# Patient Record
Sex: FEMALE | Race: WHITE | Employment: FULL TIME | URBAN - METROPOLITAN AREA
[De-identification: names, ages, dates, MRNs, and addresses within clinical notes are randomized per-mention and may not be internally consistent; named-entity substitution may affect disease eponyms.]

---

## 2019-02-07 ENCOUNTER — OFFICE VISIT (OUTPATIENT)
Dept: INTERNAL MEDICINE CLINIC | Age: 44
End: 2019-02-07
Payer: COMMERCIAL

## 2019-02-07 VITALS
HEART RATE: 82 BPM | BODY MASS INDEX: 40.82 KG/M2 | RESPIRATION RATE: 12 BRPM | DIASTOLIC BLOOD PRESSURE: 78 MMHG | WEIGHT: 245 LBS | HEIGHT: 65 IN | SYSTOLIC BLOOD PRESSURE: 130 MMHG | OXYGEN SATURATION: 98 %

## 2019-02-07 DIAGNOSIS — F32.A DEPRESSION, UNSPECIFIED DEPRESSION TYPE: ICD-10-CM

## 2019-02-07 DIAGNOSIS — I10 ESSENTIAL HYPERTENSION: ICD-10-CM

## 2019-02-07 DIAGNOSIS — E11.9 TYPE 2 DIABETES MELLITUS WITHOUT COMPLICATION, WITHOUT LONG-TERM CURRENT USE OF INSULIN (HCC): Primary | ICD-10-CM

## 2019-02-07 DIAGNOSIS — E78.5 HYPERLIPIDEMIA, UNSPECIFIED HYPERLIPIDEMIA TYPE: ICD-10-CM

## 2019-02-07 PROCEDURE — 99214 OFFICE O/P EST MOD 30 MIN: CPT | Performed by: FAMILY MEDICINE

## 2019-02-07 RX ORDER — DILTIAZEM HYDROCHLORIDE 300 MG/1
1 CAPSULE, COATED, EXTENDED RELEASE ORAL DAILY
Refills: 0 | COMMUNITY
Start: 2018-11-12 | End: 2019-02-07

## 2019-02-07 RX ORDER — DILTIAZEM HYDROCHLORIDE 300 MG/1
300 CAPSULE, COATED, EXTENDED RELEASE ORAL DAILY
Qty: 90 CAPSULE | Refills: 0 | Status: SHIPPED | OUTPATIENT
Start: 2019-02-07 | End: 2019-05-15 | Stop reason: SDUPTHER

## 2019-02-07 RX ORDER — FLUOXETINE HYDROCHLORIDE 20 MG/1
1 CAPSULE ORAL DAILY
Refills: 0 | COMMUNITY
Start: 2018-11-12 | End: 2019-02-07

## 2019-02-07 RX ORDER — LOSARTAN POTASSIUM AND HYDROCHLOROTHIAZIDE 25; 100 MG/1; MG/1
1 TABLET ORAL DAILY
Refills: 0 | COMMUNITY
Start: 2018-11-26 | End: 2019-02-07

## 2019-02-07 RX ORDER — FLUOXETINE HYDROCHLORIDE 20 MG/1
20 CAPSULE ORAL DAILY
Qty: 90 CAPSULE | Refills: 0 | Status: SHIPPED | OUTPATIENT
Start: 2019-02-07 | End: 2019-07-05 | Stop reason: SDUPTHER

## 2019-02-07 RX ORDER — LOSARTAN POTASSIUM AND HYDROCHLOROTHIAZIDE 25; 100 MG/1; MG/1
1 TABLET ORAL DAILY
Qty: 90 TABLET | Refills: 0 | Status: SHIPPED | OUTPATIENT
Start: 2019-02-07 | End: 2019-07-01 | Stop reason: SDUPTHER

## 2019-02-07 RX ORDER — ATORVASTATIN CALCIUM 20 MG/1
20 TABLET, FILM COATED ORAL DAILY
Qty: 90 TABLET | Refills: 0 | Status: SHIPPED | OUTPATIENT
Start: 2019-02-07 | End: 2019-07-09 | Stop reason: SDUPTHER

## 2019-02-07 RX ORDER — METFORMIN HYDROCHLORIDE 500 MG/1
1 TABLET, EXTENDED RELEASE ORAL DAILY
Refills: 1 | COMMUNITY
Start: 2018-12-30 | End: 2019-02-07

## 2019-02-07 RX ORDER — METFORMIN HYDROCHLORIDE 500 MG/1
500 TABLET, EXTENDED RELEASE ORAL
Qty: 90 TABLET | Refills: 0 | Status: SHIPPED | OUTPATIENT
Start: 2019-02-07 | End: 2019-08-30 | Stop reason: SDUPTHER

## 2019-02-07 RX ORDER — ATORVASTATIN CALCIUM 20 MG/1
1 TABLET, FILM COATED ORAL DAILY
Refills: 0 | COMMUNITY
Start: 2018-11-12 | End: 2019-02-07

## 2019-02-10 ASSESSMENT — ENCOUNTER SYMPTOMS
CHEST TIGHTNESS: 0
NAUSEA: 0
WHEEZING: 0
SHORTNESS OF BREATH: 0
BLOOD IN STOOL: 0
EYES NEGATIVE: 1
ABDOMINAL PAIN: 0
BACK PAIN: 0

## 2019-05-07 ENCOUNTER — TELEPHONE (OUTPATIENT)
Dept: INTERNAL MEDICINE CLINIC | Age: 44
End: 2019-05-07

## 2019-05-07 ENCOUNTER — OFFICE VISIT (OUTPATIENT)
Dept: INTERNAL MEDICINE CLINIC | Age: 44
End: 2019-05-07
Payer: COMMERCIAL

## 2019-05-07 VITALS
OXYGEN SATURATION: 98 % | WEIGHT: 244 LBS | BODY MASS INDEX: 40.65 KG/M2 | SYSTOLIC BLOOD PRESSURE: 126 MMHG | HEART RATE: 77 BPM | HEIGHT: 65 IN | DIASTOLIC BLOOD PRESSURE: 78 MMHG

## 2019-05-07 DIAGNOSIS — S20.359A: Primary | ICD-10-CM

## 2019-05-07 DIAGNOSIS — D49.2 NEOPLASM OF SKIN OF EYELID: ICD-10-CM

## 2019-05-07 PROCEDURE — 99213 OFFICE O/P EST LOW 20 MIN: CPT | Performed by: FAMILY MEDICINE

## 2019-05-07 ASSESSMENT — ENCOUNTER SYMPTOMS
EYE PAIN: 0
SHORTNESS OF BREATH: 0
ABDOMINAL PAIN: 0
CHEST TIGHTNESS: 0
EYE DISCHARGE: 0
COUGH: 0
NAUSEA: 0
BACK PAIN: 0

## 2019-05-07 NOTE — TELEPHONE ENCOUNTER
Pt called to report Urgent care could not remove the foreign object from her skin, Pt is requesting the referral to Dr Ana M Navarrete be made.

## 2019-05-07 NOTE — TELEPHONE ENCOUNTER
Referral to Dr. Parry Bark ordered. Give the patient their office number to call him tomorrow for an appointment(That is how their office wants it done--she can also ask about her eye lesion.  I did include that on the referral as well)

## 2019-05-15 DIAGNOSIS — I10 ESSENTIAL HYPERTENSION: ICD-10-CM

## 2019-05-15 RX ORDER — DILTIAZEM HYDROCHLORIDE 300 MG/1
CAPSULE, COATED, EXTENDED RELEASE ORAL
Qty: 90 CAPSULE | Refills: 0 | Status: SHIPPED | OUTPATIENT
Start: 2019-05-15 | End: 2019-07-05 | Stop reason: SDUPTHER

## 2019-07-01 DIAGNOSIS — I10 ESSENTIAL HYPERTENSION: ICD-10-CM

## 2019-07-01 RX ORDER — LOSARTAN POTASSIUM AND HYDROCHLOROTHIAZIDE 25; 100 MG/1; MG/1
TABLET ORAL
Qty: 90 TABLET | Refills: 0 | Status: SHIPPED | OUTPATIENT
Start: 2019-07-01 | End: 2019-09-13 | Stop reason: SDUPTHER

## 2019-07-05 DIAGNOSIS — F32.A DEPRESSION, UNSPECIFIED DEPRESSION TYPE: ICD-10-CM

## 2019-07-05 DIAGNOSIS — I10 ESSENTIAL HYPERTENSION: ICD-10-CM

## 2019-07-08 RX ORDER — FLUOXETINE HYDROCHLORIDE 20 MG/1
CAPSULE ORAL
Qty: 90 CAPSULE | Refills: 0 | Status: SHIPPED | OUTPATIENT
Start: 2019-07-08 | End: 2019-09-13 | Stop reason: SDUPTHER

## 2019-07-08 RX ORDER — DILTIAZEM HYDROCHLORIDE 300 MG/1
CAPSULE, COATED, EXTENDED RELEASE ORAL
Qty: 90 CAPSULE | Refills: 0 | Status: SHIPPED | OUTPATIENT
Start: 2019-07-08 | End: 2019-09-13 | Stop reason: SDUPTHER

## 2019-07-09 DIAGNOSIS — E78.5 HYPERLIPIDEMIA, UNSPECIFIED HYPERLIPIDEMIA TYPE: ICD-10-CM

## 2019-07-10 RX ORDER — ATORVASTATIN CALCIUM 20 MG/1
TABLET, FILM COATED ORAL
Qty: 90 TABLET | Refills: 0 | Status: SHIPPED | OUTPATIENT
Start: 2019-07-10 | End: 2019-09-13 | Stop reason: SDUPTHER

## 2019-08-30 DIAGNOSIS — E11.9 TYPE 2 DIABETES MELLITUS WITHOUT COMPLICATION, WITHOUT LONG-TERM CURRENT USE OF INSULIN (HCC): ICD-10-CM

## 2019-08-30 RX ORDER — METFORMIN HYDROCHLORIDE 500 MG/1
TABLET, EXTENDED RELEASE ORAL
Qty: 90 TABLET | Refills: 0 | Status: SHIPPED | OUTPATIENT
Start: 2019-08-30 | End: 2019-09-13 | Stop reason: SDUPTHER

## 2019-09-13 ENCOUNTER — OFFICE VISIT (OUTPATIENT)
Dept: INTERNAL MEDICINE CLINIC | Age: 44
End: 2019-09-13
Payer: COMMERCIAL

## 2019-09-13 VITALS
HEIGHT: 65 IN | SYSTOLIC BLOOD PRESSURE: 120 MMHG | OXYGEN SATURATION: 96 % | BODY MASS INDEX: 41.79 KG/M2 | DIASTOLIC BLOOD PRESSURE: 94 MMHG | WEIGHT: 250.8 LBS | HEART RATE: 81 BPM

## 2019-09-13 DIAGNOSIS — E78.5 HYPERLIPIDEMIA, UNSPECIFIED HYPERLIPIDEMIA TYPE: ICD-10-CM

## 2019-09-13 DIAGNOSIS — E11.9 TYPE 2 DIABETES MELLITUS WITHOUT COMPLICATION, WITHOUT LONG-TERM CURRENT USE OF INSULIN (HCC): Primary | ICD-10-CM

## 2019-09-13 DIAGNOSIS — I10 ESSENTIAL HYPERTENSION: ICD-10-CM

## 2019-09-13 DIAGNOSIS — Z23 NEED FOR DIPHTHERIA-TETANUS-PERTUSSIS (TDAP) VACCINE: ICD-10-CM

## 2019-09-13 DIAGNOSIS — F32.A DEPRESSION, UNSPECIFIED DEPRESSION TYPE: ICD-10-CM

## 2019-09-13 LAB
AVERAGE GLUCOSE: NORMAL
HBA1C MFR BLD: 6.5 %

## 2019-09-13 PROCEDURE — 90471 IMMUNIZATION ADMIN: CPT | Performed by: FAMILY MEDICINE

## 2019-09-13 PROCEDURE — 99214 OFFICE O/P EST MOD 30 MIN: CPT | Performed by: FAMILY MEDICINE

## 2019-09-13 PROCEDURE — 90715 TDAP VACCINE 7 YRS/> IM: CPT | Performed by: FAMILY MEDICINE

## 2019-09-13 RX ORDER — METFORMIN HYDROCHLORIDE 500 MG/1
TABLET, EXTENDED RELEASE ORAL
Qty: 90 TABLET | Refills: 1 | Status: SHIPPED | OUTPATIENT
Start: 2019-09-13 | End: 2020-03-13 | Stop reason: SDUPTHER

## 2019-09-13 RX ORDER — ATORVASTATIN CALCIUM 20 MG/1
TABLET, FILM COATED ORAL
Qty: 90 TABLET | Refills: 1 | Status: SHIPPED | OUTPATIENT
Start: 2019-09-13 | End: 2020-03-13 | Stop reason: SDUPTHER

## 2019-09-13 RX ORDER — FLUOXETINE HYDROCHLORIDE 20 MG/1
CAPSULE ORAL
Qty: 90 CAPSULE | Refills: 1 | Status: SHIPPED | OUTPATIENT
Start: 2019-09-13 | End: 2020-03-13 | Stop reason: SDUPTHER

## 2019-09-13 RX ORDER — LOSARTAN POTASSIUM AND HYDROCHLOROTHIAZIDE 25; 100 MG/1; MG/1
TABLET ORAL
Qty: 90 TABLET | Refills: 1 | Status: SHIPPED | OUTPATIENT
Start: 2019-09-13 | End: 2019-12-16

## 2019-09-13 RX ORDER — DILTIAZEM HYDROCHLORIDE 300 MG/1
CAPSULE, COATED, EXTENDED RELEASE ORAL
Qty: 90 CAPSULE | Refills: 1 | Status: SHIPPED | OUTPATIENT
Start: 2019-09-13 | End: 2020-03-13 | Stop reason: SDUPTHER

## 2019-09-16 NOTE — PROGRESS NOTES
Aunt        Social History     Tobacco Use    Smoking status: Never Smoker    Smokeless tobacco: Never Used   Substance Use Topics    Alcohol use: Yes     Comment: rarely; once year    Drug use: No       Current Outpatient Medications   Medication Sig Dispense Refill    atorvastatin (LIPITOR) 20 MG tablet TAKE 1 TABLET BY MOUTH EVERY DAY 90 tablet 1    diltiazem (CARDIZEM CD) 300 MG extended release capsule TAKE 1 CAPSULE BY MOUTH EVERY DAY 90 capsule 1    FLUoxetine (PROZAC) 20 MG capsule TAKE 1 CAPSULE BY MOUTH EVERY DAY 90 capsule 1    losartan-hydrochlorothiazide (HYZAAR) 100-25 MG per tablet TAKE 1 TABLET BY MOUTH EVERY DAY 90 tablet 1    metFORMIN (GLUCOPHAGE-XR) 500 MG extended release tablet TAKE 1 TABLET BY MOUTH EVERY DAY WITH BREAKFAST 90 tablet 1     No current facility-administered medications for this visit. OBJECTIVE:    BP (!) 120/94   Pulse 81   Ht 5' 5\" (1.651 m)   Wt 250 lb 12.8 oz (113.8 kg)   SpO2 96%   Breastfeeding? No   BMI 41.74 kg/m²     Physical Exam   Constitutional: She is oriented to person, place, and time. She appears well-developed. No distress. HENT:   Nose: Nose normal.   Mouth/Throat: Oropharynx is clear and moist.   Eyes: Conjunctivae are normal. No scleral icterus. Neck: Neck supple. Cardiovascular: Normal rate, regular rhythm and normal heart sounds. Pulmonary/Chest: Effort normal and breath sounds normal. No respiratory distress. Abdominal: Soft. Bowel sounds are normal. She exhibits no distension. There is no tenderness. Musculoskeletal: Normal range of motion. She exhibits no edema. Lymphadenopathy:     She has no cervical adenopathy. Neurological: She is alert and oriented to person, place, and time. No cranial nerve deficit. Skin: Skin is warm and dry. Psychiatric: She has a normal mood and affect. Vitals reviewed. ASSESSMENT:  1.  Type 2 diabetes mellitus without complication, without long-term current use of insulin ([address]

## 2019-09-17 ASSESSMENT — ENCOUNTER SYMPTOMS
NAUSEA: 0
COUGH: 0
ABDOMINAL PAIN: 0
BLOOD IN STOOL: 0
SHORTNESS OF BREATH: 0
EYES NEGATIVE: 1
WHEEZING: 0
BACK PAIN: 0

## 2019-12-16 ENCOUNTER — TELEPHONE (OUTPATIENT)
Dept: INTERNAL MEDICINE CLINIC | Age: 44
End: 2019-12-16

## 2019-12-16 RX ORDER — HYDROCHLOROTHIAZIDE 25 MG/1
25 TABLET ORAL EVERY MORNING
Qty: 90 TABLET | Refills: 0 | Status: SHIPPED | OUTPATIENT
Start: 2019-12-16 | End: 2020-03-13 | Stop reason: SDUPTHER

## 2019-12-16 RX ORDER — LOSARTAN POTASSIUM 100 MG/1
100 TABLET ORAL DAILY
Qty: 90 TABLET | Refills: 0 | Status: SHIPPED | OUTPATIENT
Start: 2019-12-16 | End: 2020-03-13 | Stop reason: SDUPTHER

## 2020-03-13 ENCOUNTER — OFFICE VISIT (OUTPATIENT)
Dept: INTERNAL MEDICINE CLINIC | Age: 45
End: 2020-03-13
Payer: COMMERCIAL

## 2020-03-13 VITALS
BODY MASS INDEX: 39.99 KG/M2 | WEIGHT: 240 LBS | SYSTOLIC BLOOD PRESSURE: 130 MMHG | DIASTOLIC BLOOD PRESSURE: 88 MMHG | HEIGHT: 65 IN | OXYGEN SATURATION: 98 % | HEART RATE: 93 BPM

## 2020-03-13 PROCEDURE — 99214 OFFICE O/P EST MOD 30 MIN: CPT | Performed by: FAMILY MEDICINE

## 2020-03-13 RX ORDER — METFORMIN HYDROCHLORIDE 500 MG/1
TABLET, EXTENDED RELEASE ORAL
Qty: 90 TABLET | Refills: 1 | Status: SHIPPED | OUTPATIENT
Start: 2020-03-13 | End: 2020-12-29 | Stop reason: SDUPTHER

## 2020-03-13 RX ORDER — ATORVASTATIN CALCIUM 20 MG/1
TABLET, FILM COATED ORAL
Qty: 90 TABLET | Refills: 1 | Status: SHIPPED | OUTPATIENT
Start: 2020-03-13 | End: 2020-12-28 | Stop reason: SDUPTHER

## 2020-03-13 RX ORDER — HYDROCHLOROTHIAZIDE 25 MG/1
25 TABLET ORAL EVERY MORNING
Qty: 90 TABLET | Refills: 1 | Status: SHIPPED | OUTPATIENT
Start: 2020-03-13 | End: 2020-12-29 | Stop reason: SDUPTHER

## 2020-03-13 RX ORDER — LOSARTAN POTASSIUM 100 MG/1
100 TABLET ORAL DAILY
Qty: 90 TABLET | Refills: 1 | Status: SHIPPED | OUTPATIENT
Start: 2020-03-13 | End: 2020-12-29 | Stop reason: SDUPTHER

## 2020-03-13 RX ORDER — DILTIAZEM HYDROCHLORIDE 300 MG/1
CAPSULE, COATED, EXTENDED RELEASE ORAL
Qty: 90 CAPSULE | Refills: 1 | Status: SHIPPED | OUTPATIENT
Start: 2020-03-13 | End: 2020-12-29 | Stop reason: SDUPTHER

## 2020-03-13 RX ORDER — FLUOXETINE HYDROCHLORIDE 20 MG/1
CAPSULE ORAL
Qty: 90 CAPSULE | Refills: 1 | Status: SHIPPED | OUTPATIENT
Start: 2020-03-13 | End: 2020-12-28 | Stop reason: SDUPTHER

## 2020-03-13 ASSESSMENT — PATIENT HEALTH QUESTIONNAIRE - PHQ9
SUM OF ALL RESPONSES TO PHQ9 QUESTIONS 1 & 2: 0
SUM OF ALL RESPONSES TO PHQ QUESTIONS 1-9: 0
1. LITTLE INTEREST OR PLEASURE IN DOING THINGS: 0
SUM OF ALL RESPONSES TO PHQ QUESTIONS 1-9: 0
2. FEELING DOWN, DEPRESSED OR HOPELESS: 0

## 2020-03-13 ASSESSMENT — ENCOUNTER SYMPTOMS
BACK PAIN: 0
CHEST TIGHTNESS: 0
SHORTNESS OF BREATH: 0
NAUSEA: 0
COUGH: 0
ABDOMINAL PAIN: 0

## 2020-03-13 NOTE — PROGRESS NOTES
Carlos Stark  [birth year]  41 y.o.  female    Chief Complaint   Patient presents with    6 Month Follow-Up         History of Present Illness  Carlos Stark is a 41 y.o. female who presents today for a check up. Last labs reviewed, but no all labs completed. No Cp or Sob.  -DM II- Stable on Metformin. Previous Hba1c 6.5  -HTN- Stable on medications  -HLD- LDL 94, HDL 30, . On Lipitor 20  -Depression- Stable at Fluoxetine 20 mg    Review of Systems   Constitutional: Negative for diaphoresis and fever. HENT: Negative. Respiratory: Negative for cough, chest tightness and shortness of breath. Cardiovascular: Negative for chest pain and palpitations. Gastrointestinal: Negative for abdominal pain and nausea. Genitourinary: Negative for difficulty urinating. Musculoskeletal: Negative for back pain. Neurological: Negative for dizziness, light-headedness and headaches. Psychiatric/Behavioral: Negative for dysphoric mood and sleep disturbance. No Known Allergies    Past Medical History:   Diagnosis Date    Depression     Essential hypertension     Hyperlipidemia     Major depressive disorder, single episode, unspecified     Obesity     Obesity, unspecified     Type 2 diabetes mellitus ([facility] [address]        History reviewed. No pertinent surgical history.     Family History   Problem Relation Age of Onset    Coronary Art Dis Mother     Heart Attack Mother     Heart Disease Mother     High Blood Pressure Mother     High Cholesterol Mother     Atrial Fibrillation Mother     Heart Surgery Mother     High Blood Pressure Father     Deep Vein Thrombosis Father     Diabetes Father     Alcohol Abuse Father     Diabetes Paternal Aunt     Heart Failure Maternal Grandmother     High Blood Pressure Paternal Grandmother     Prostate Cancer Paternal Grandfather         Spread to bones    Diabetes Paternal Aunt        Social History     Tobacco Use    Smoking status: Never Smoker    Smokeless tobacco: Never Used   Substance Use Topics    Alcohol use: Yes     Comment: rarely; once year    Drug use: No       Current Outpatient Medications   Medication Sig Dispense Refill    metFORMIN (GLUCOPHAGE-XR) 500 MG extended release tablet TAKE 1 TABLET BY MOUTH EVERY DAY WITH BREAKFAST 90 tablet 1    FLUoxetine (PROZAC) 20 MG capsule TAKE 1 CAPSULE BY MOUTH EVERY DAY 90 capsule 1    dilTIAZem (CARDIZEM CD) 300 MG extended release capsule TAKE 1 CAPSULE BY MOUTH EVERY DAY 90 capsule 1    atorvastatin (LIPITOR) 20 MG tablet TAKE 1 TABLET BY MOUTH EVERY DAY 90 tablet 1    losartan (COZAAR) 100 MG tablet Take 1 tablet by mouth daily 90 tablet 1    hydroCHLOROthiazide (HYDRODIURIL) 25 MG tablet Take 1 tablet by mouth every morning 90 tablet 1     No current facility-administered medications for this visit. OBJECTIVE:    /88   Pulse 93   Ht 5' 5\" (1.651 m)   Wt 240 lb (108.9 kg)   SpO2 98%   BMI 39.94 kg/m²     Physical Exam  Vitals signs reviewed. Constitutional:       General: She is not in acute distress. Appearance: She is well-developed. Eyes:      Conjunctiva/sclera: Conjunctivae normal.   Neck:      Musculoskeletal: Neck supple. Cardiovascular:      Rate and Rhythm: Normal rate and regular rhythm. Heart sounds: Normal heart sounds. Pulmonary:      Effort: Pulmonary effort is normal. No respiratory distress. Breath sounds: Normal breath sounds. Abdominal:      General: Bowel sounds are normal.      Palpations: Abdomen is soft. Tenderness: There is no abdominal tenderness. Musculoskeletal:      Right lower leg: No edema. Left lower leg: No edema. Neurological:      Mental Status: She is alert and oriented to person, place, and time. Cranial Nerves: No cranial nerve deficit. Psychiatric:         Mood and Affect: Mood normal.         ASSESSMENT:  1.  Type 2 diabetes mellitus without complication, without long-term current use of insulin

## 2020-12-28 RX ORDER — FLUOXETINE HYDROCHLORIDE 20 MG/1
CAPSULE ORAL
Qty: 90 CAPSULE | Refills: 0 | Status: SHIPPED | OUTPATIENT
Start: 2020-12-28 | End: 2021-03-24

## 2020-12-28 RX ORDER — ATORVASTATIN CALCIUM 20 MG/1
TABLET, FILM COATED ORAL
Qty: 90 TABLET | Refills: 0 | Status: SHIPPED | OUTPATIENT
Start: 2020-12-28 | End: 2021-03-24

## 2020-12-29 ENCOUNTER — VIRTUAL VISIT (OUTPATIENT)
Dept: INTERNAL MEDICINE CLINIC | Age: 45
End: 2020-12-29
Payer: COMMERCIAL

## 2020-12-29 DIAGNOSIS — F32.A DEPRESSION, UNSPECIFIED DEPRESSION TYPE: ICD-10-CM

## 2020-12-29 DIAGNOSIS — I10 ESSENTIAL HYPERTENSION: ICD-10-CM

## 2020-12-29 DIAGNOSIS — E78.5 HYPERLIPIDEMIA, UNSPECIFIED HYPERLIPIDEMIA TYPE: ICD-10-CM

## 2020-12-29 DIAGNOSIS — E11.9 TYPE 2 DIABETES MELLITUS WITHOUT COMPLICATION, WITHOUT LONG-TERM CURRENT USE OF INSULIN (HCC): Primary | ICD-10-CM

## 2020-12-29 PROCEDURE — 99214 OFFICE O/P EST MOD 30 MIN: CPT | Performed by: FAMILY MEDICINE

## 2020-12-29 RX ORDER — METFORMIN HYDROCHLORIDE 500 MG/1
TABLET, EXTENDED RELEASE ORAL
Qty: 90 TABLET | Refills: 0 | Status: SHIPPED | OUTPATIENT
Start: 2020-12-29 | End: 2021-10-04 | Stop reason: SDUPTHER

## 2020-12-29 RX ORDER — HYDROCHLOROTHIAZIDE 25 MG/1
25 TABLET ORAL EVERY MORNING
Qty: 90 TABLET | Refills: 0 | Status: SHIPPED | OUTPATIENT
Start: 2020-12-29 | End: 2022-01-24

## 2020-12-29 RX ORDER — LOSARTAN POTASSIUM 100 MG/1
100 TABLET ORAL DAILY
Qty: 90 TABLET | Refills: 0 | Status: SHIPPED | OUTPATIENT
Start: 2020-12-29 | End: 2021-03-23

## 2020-12-29 RX ORDER — DILTIAZEM HYDROCHLORIDE 300 MG/1
CAPSULE, COATED, EXTENDED RELEASE ORAL
Qty: 90 CAPSULE | Refills: 0 | Status: SHIPPED | OUTPATIENT
Start: 2020-12-29 | End: 2021-11-22

## 2020-12-29 ASSESSMENT — ENCOUNTER SYMPTOMS
CHEST TIGHTNESS: 0
COUGH: 0
BACK PAIN: 0
CONSTIPATION: 0
DIARRHEA: 0
NAUSEA: 0
BLOOD IN STOOL: 0
ABDOMINAL PAIN: 0
SHORTNESS OF BREATH: 0
EYES NEGATIVE: 1

## 2020-12-29 NOTE — PROGRESS NOTES
2020    TELEHEALTH EVALUATION -- Audio/Visual (During ZKDPS-32 public health emergency)    HPI:    Gustavo Esparza (:  1975) has requested an audio/video evaluation for the following concern(s):  Patient: Home  Provider: Office  Patient Active Problem List    Diagnosis Date Noted    Type 2 diabetes mellitus ([facility] [address]     Depression     Essential hypertension     Hyperlipidemia    -She feels stable on medications  -She does not monitor her BS or her Bp  -Working from home and trying to avoid exposure to Covid 19      Review of Systems   Constitutional: Negative for chills, diaphoresis and fever. HENT: Negative. Eyes: Negative. Respiratory: Negative for cough, chest tightness and shortness of breath. Cardiovascular: Negative for chest pain and palpitations. Gastrointestinal: Negative for abdominal pain, blood in stool, constipation, diarrhea and nausea. Genitourinary: Negative for difficulty urinating and dysuria. Musculoskeletal: Negative for back pain and neck pain. Neurological: Negative for dizziness, light-headedness and headaches. Psychiatric/Behavioral: Negative for dysphoric mood. Prior to Visit Medications    Medication Sig Taking?  Authorizing Provider   metFORMIN (GLUCOPHAGE-XR) 500 MG extended release tablet TAKE 1 TABLET BY MOUTH EVERY DAY WITH BREAKFAST Yes Katrina Braun DO   dilTIAZem (CARDIZEM CD) 300 MG extended release capsule TAKE 1 CAPSULE BY MOUTH EVERY DAY Yes Festus Huffman DO   losartan (COZAAR) 100 MG tablet Take 1 tablet by mouth daily Yes Festus Huffman DO   hydroCHLOROthiazide (HYDRODIURIL) 25 MG tablet Take 1 tablet by mouth every morning Yes Festus Huffman DO   atorvastatin (LIPITOR) 20 MG tablet TAKE 1 TABLET BY MOUTH EVERY DAY Yes Katrina Braun DO   FLUoxetine (PROZAC) 20 MG capsule TAKE 1 CAPSULE BY MOUTH EVERY DAY Yes Festus Huffman DO       Social History     Tobacco Use    Smoking status: Never Smoker    Smokeless tobacco: Never Used Substance Use Topics    Alcohol use: Yes     Comment: rarely; once year    Drug use: No        No Known Allergies,   Past Medical History:   Diagnosis Date    Depression     Essential hypertension     Hyperlipidemia     Major depressive disorder, single episode, unspecified     Obesity     Obesity, unspecified     Type 2 diabetes mellitus ([facility] [address]    , History reviewed. No pertinent surgical history. ,   Social History     Tobacco Use    Smoking status: Never Smoker    Smokeless tobacco: Never Used   Substance Use Topics    Alcohol use: Yes     Comment: rarely; once year    Drug use: No   ,   Family History   Problem Relation Age of Onset    Coronary Art Dis Mother     Heart Attack Mother     Heart Disease Mother     High Blood Pressure Mother     High Cholesterol Mother     Atrial Fibrillation Mother     Heart Surgery Mother     High Blood Pressure Father     Deep Vein Thrombosis Father     Diabetes Father     Alcohol Abuse Father     Diabetes Paternal Aunt     Heart Failure Maternal Grandmother     High Blood Pressure Paternal Grandmother     Prostate Cancer Paternal Grandfather         Spread to bones    Diabetes Paternal Aunt        PHYSICAL EXAMINATION:  [ INSTRUCTIONS:  \"[x]\" Indicates a positive item  \"[]\" Indicates a negative item  -- DELETE ALL ITEMS NOT EXAMINED]  Vital Signs: (As obtained by patient/caregiver or practitioner observation)      Constitutional: [x] Appears well-developed and well-nourished [x] No apparent distress      [] Abnormal-   Mental status  [x] Alert and awake  [x] Oriented to person/place/time [x]Able to follow commands      Eyes:  EOM    [x]  Normal  [] Abnormal-  Sclera  [x]  Normal  [] Abnormal -         Discharge []  None visible  [] Abnormal -    HENT:   [x] Normocephalic, atraumatic.   [] Abnormal   [x] Mouth/Throat: Mucous membranes are moist.     External Ears [x] Normal  [] Abnormal-     Neck: [x] No visualized mass Pulmonary/Chest: [x] Respiratory effort normal.  [x] No visualized signs of difficulty breathing or respiratory distress        [] Abnormal-      Musculoskeletal:   [] Normal gait with no signs of ataxia         [x] Normal range of motion of neck        [] Abnormal-       Neurological:        [x] No Facial Asymmetry (Cranial nerve 7 motor function) (limited exam to video visit)          [] No gaze palsy        [] Abnormal-         Skin:        [x] No significant exanthematous lesions or discoloration noted on facial skin         [] Abnormal-            Psychiatric:       [x] Normal Affect [x] No Hallucinations        [] Abnormal-     Other pertinent observable physical exam findings-     ASSESSMENT:  1. Type 2 diabetes mellitus without complication, without long-term current use of insulin ([facility] [address]  2. Essential hypertension  3. Depression, unspecified depression type  4.  Hyperlipidemia, unspecified hyperlipidemia type    PLAN:    Orders Placed This Encounter   Procedures    Hemoglobin A1C    Comprehensive Metabolic Panel    Lipid Panel    Microalbumin / Creatinine Urine Ratio    Urinalysis with Microscopic       Orders Placed This Encounter   Medications    metFORMIN (GLUCOPHAGE-XR) 500 MG extended release tablet     Sig: TAKE 1 TABLET BY MOUTH EVERY DAY WITH BREAKFAST     Dispense:  90 tablet     Refill:  0    dilTIAZem (CARDIZEM CD) 300 MG extended release capsule     Sig: TAKE 1 CAPSULE BY MOUTH EVERY DAY     Dispense:  90 capsule     Refill:  0    losartan (COZAAR) 100 MG tablet     Sig: Take 1 tablet by mouth daily     Dispense:  90 tablet     Refill:  0     D/C Hyzaar    hydroCHLOROthiazide (HYDRODIURIL) 25 MG tablet     Sig: Take 1 tablet by mouth every morning     Dispense:  90 tablet     Refill:  0   Obtain lab- Pt will [facility] to [facility]  Continue medications  Monitor BS and Bp  Obtain eye exams The patient is asked to make an attempt to improve diet and exercise patterns to aid in medical management of this problem. Declines all vaccinations  Keep f/u with Gyn    Return in about 3 months (around 3/29/2021) for Diabetes. Scar Child is a 41 y.o. female being evaluated by a Virtual Visit (video visit) encounter to address concerns as mentioned above. A caregiver was present when appropriate. Due to this being a TeleHealth encounter (During [place]- public health emergency), evaluation of the following organ systems was limited: Vitals/Constitutional/EENT/Resp/CV/GI//MS/Neuro/Skin/Heme-Lymph-Imm. Pursuant to the emergency declaration under the [address] [place] [address], [address] [place] [address] authority and the [employer] and [employer] Act, this Virtual Visit was conducted with patient's (and/or legal guardian's) consent, to reduce the patient's risk of exposure to COVID-19 and provide necessary medical care. The patient (and/or legal guardian) has also been advised to contact this office for worsening conditions or problems, and seek emergency medical treatment and/or call 911 if deemed necessary. Patient identification was verified at the start of the visit: Yes    Total time spent on this encounter: Not billed by time    Services were provided through a video synchronous discussion virtually to substitute for in-person clinic visit. Patient and provider were located at their individual homes. --Marina Olvera DO on 12/29/2020 at 4:05 PM    An electronic signature was used to authenticate this note.

## 2021-03-23 RX ORDER — LOSARTAN POTASSIUM 100 MG/1
TABLET ORAL
Qty: 90 TABLET | Refills: 0 | Status: SHIPPED | OUTPATIENT
Start: 2021-03-23 | End: 2022-02-09 | Stop reason: SDUPTHER

## 2021-03-24 DIAGNOSIS — F32.A DEPRESSION, UNSPECIFIED DEPRESSION TYPE: ICD-10-CM

## 2021-03-24 DIAGNOSIS — E78.5 HYPERLIPIDEMIA, UNSPECIFIED HYPERLIPIDEMIA TYPE: ICD-10-CM

## 2021-03-24 RX ORDER — ATORVASTATIN CALCIUM 20 MG/1
TABLET, FILM COATED ORAL
Qty: 90 TABLET | Refills: 0 | Status: SHIPPED | OUTPATIENT
Start: 2021-03-24 | End: 2022-06-29 | Stop reason: SDUPTHER

## 2021-03-24 RX ORDER — FLUOXETINE HYDROCHLORIDE 20 MG/1
CAPSULE ORAL
Qty: 90 CAPSULE | Refills: 0 | Status: SHIPPED | OUTPATIENT
Start: 2021-03-24 | End: 2022-06-29 | Stop reason: SDUPTHER

## 2021-05-01 LAB
ALBUMIN SERPL-MCNC: 4.2 G/DL
ALP BLD-CCNC: 85 U/L
ALT SERPL-CCNC: 16 U/L
ANION GAP SERPL CALCULATED.3IONS-SCNC: NORMAL MMOL/L
AST SERPL-CCNC: 17 U/L
AVERAGE GLUCOSE: NORMAL
BILIRUB SERPL-MCNC: 0.3 MG/DL (ref 0.1–1.4)
BILIRUBIN, URINE: NEGATIVE
BLOOD, URINE: NEGATIVE
BUN BLDV-MCNC: 8 MG/DL
CALCIUM SERPL-MCNC: 9.4 MG/DL
CHLORIDE BLD-SCNC: 102 MMOL/L
CHOLESTEROL, TOTAL: 196 MG/DL
CHOLESTEROL/HDL RATIO: NORMAL
CLARITY: ABNORMAL
CO2: 24 MMOL/L
COLOR: YELLOW
CREAT SERPL-MCNC: 0.8 MG/DL
CREATININE, URINE: 142.4
GFR CALCULATED: NORMAL
GLUCOSE BLD-MCNC: 142 MG/DL
GLUCOSE URINE: NEGATIVE
HBA1C MFR BLD: 6.5 %
HDLC SERPL-MCNC: 36 MG/DL (ref 35–70)
KETONES, URINE: NEGATIVE
LDL CHOLESTEROL CALCULATED: 128 MG/DL (ref 0–160)
LEUKOCYTE ESTERASE, URINE: ABNORMAL
MICROALBUMIN/CREAT 24H UR: 960 MG/G{CREAT}
MICROALBUMIN/CREAT UR-RTO: 7
NITRITE, URINE: NEGATIVE
NONHDLC SERPL-MCNC: NORMAL MG/DL
PH UA: 8 (ref 4.5–8)
POTASSIUM SERPL-SCNC: 4.4 MMOL/L
PROTEIN UA: ABNORMAL
SODIUM BLD-SCNC: 137 MMOL/L
SPECIFIC GRAVITY, URINE: 1.01
TOTAL PROTEIN: 7.4
TRIGL SERPL-MCNC: 161 MG/DL
UROBILINOGEN, URINE: NORMAL
VLDLC SERPL CALC-MCNC: 32 MG/DL

## 2021-05-03 ENCOUNTER — TELEMEDICINE (OUTPATIENT)
Dept: INTERNAL MEDICINE CLINIC | Age: 46
End: 2021-05-03
Payer: COMMERCIAL

## 2021-05-03 DIAGNOSIS — F32.A DEPRESSION, UNSPECIFIED DEPRESSION TYPE: ICD-10-CM

## 2021-05-03 DIAGNOSIS — R82.90 ABNORMAL URINALYSIS: ICD-10-CM

## 2021-05-03 DIAGNOSIS — E78.5 HYPERLIPIDEMIA, UNSPECIFIED HYPERLIPIDEMIA TYPE: ICD-10-CM

## 2021-05-03 DIAGNOSIS — I10 ESSENTIAL HYPERTENSION: ICD-10-CM

## 2021-05-03 DIAGNOSIS — E11.9 TYPE 2 DIABETES MELLITUS WITHOUT COMPLICATION, WITHOUT LONG-TERM CURRENT USE OF INSULIN (HCC): Primary | ICD-10-CM

## 2021-05-03 PROCEDURE — 99214 OFFICE O/P EST MOD 30 MIN: CPT | Performed by: FAMILY MEDICINE

## 2021-05-03 RX ORDER — HYDROCHLOROTHIAZIDE 25 MG/1
25 TABLET ORAL EVERY MORNING
Qty: 90 TABLET | Refills: 0 | Status: CANCELLED | OUTPATIENT
Start: 2021-05-03

## 2021-05-03 RX ORDER — FLUOXETINE HYDROCHLORIDE 20 MG/1
20 CAPSULE ORAL DAILY
Qty: 90 CAPSULE | Refills: 0 | Status: CANCELLED | OUTPATIENT
Start: 2021-05-03

## 2021-05-03 RX ORDER — ATORVASTATIN CALCIUM 20 MG/1
20 TABLET, FILM COATED ORAL DAILY
Qty: 90 TABLET | Refills: 0 | Status: CANCELLED | OUTPATIENT
Start: 2021-05-03

## 2021-05-03 RX ORDER — LOSARTAN POTASSIUM 100 MG/1
TABLET ORAL
Qty: 90 TABLET | Refills: 0 | Status: CANCELLED | OUTPATIENT
Start: 2021-05-03

## 2021-05-03 RX ORDER — DILTIAZEM HYDROCHLORIDE 300 MG/1
CAPSULE, COATED, EXTENDED RELEASE ORAL
Qty: 90 CAPSULE | Refills: 0 | Status: CANCELLED | OUTPATIENT
Start: 2021-05-03

## 2021-05-03 RX ORDER — METFORMIN HYDROCHLORIDE 500 MG/1
TABLET, EXTENDED RELEASE ORAL
Qty: 90 TABLET | Refills: 0 | Status: CANCELLED | OUTPATIENT
Start: 2021-05-03

## 2021-05-03 SDOH — ECONOMIC STABILITY: TRANSPORTATION INSECURITY
IN THE PAST 12 MONTHS, HAS THE LACK OF TRANSPORTATION KEPT YOU FROM MEDICAL APPOINTMENTS OR FROM GETTING MEDICATIONS?: NO

## 2021-05-03 SDOH — ECONOMIC STABILITY: FOOD INSECURITY: WITHIN THE PAST 12 MONTHS, THE FOOD YOU BOUGHT JUST DIDN'T LAST AND YOU DIDN'T HAVE MONEY TO GET MORE.: NEVER TRUE

## 2021-05-03 ASSESSMENT — ENCOUNTER SYMPTOMS
SHORTNESS OF BREATH: 0
BACK PAIN: 0
CONSTIPATION: 0
NAUSEA: 0
ABDOMINAL PAIN: 0
COUGH: 0
DIARRHEA: 0
CHEST TIGHTNESS: 0

## 2021-05-03 ASSESSMENT — PATIENT HEALTH QUESTIONNAIRE - PHQ9
1. LITTLE INTEREST OR PLEASURE IN DOING THINGS: 0
SUM OF ALL RESPONSES TO PHQ QUESTIONS 1-9: 0
SUM OF ALL RESPONSES TO PHQ9 QUESTIONS 1 & 2: 0

## 2021-05-03 NOTE — PROGRESS NOTES
Social History     Tobacco Use    Smoking status: Never Smoker    Smokeless tobacco: Never Used   Substance Use Topics    Alcohol use: Yes     Comment: rarely; once year    Drug use: No       Current Outpatient Medications   Medication Sig Dispense Refill    atorvastatin (LIPITOR) 20 MG tablet TAKE 1 TABLET BY MOUTH EVERY DAY 90 tablet 0    FLUoxetine (PROZAC) 20 MG capsule TAKE 1 CAPSULE BY MOUTH EVERY DAY 90 capsule 0    losartan (COZAAR) 100 MG tablet TAKE 1 TABLET BY MOUTH EVERY DAY 90 tablet 0    metFORMIN (GLUCOPHAGE-XR) 500 MG extended release tablet TAKE 1 TABLET BY MOUTH EVERY DAY WITH BREAKFAST 90 tablet 0    dilTIAZem (CARDIZEM CD) 300 MG extended release capsule TAKE 1 CAPSULE BY MOUTH EVERY DAY 90 capsule 0    hydroCHLOROthiazide (HYDRODIURIL) 25 MG tablet Take 1 tablet by mouth every morning 90 tablet 0     No current facility-administered medications for this visit. OBJECTIVE:  HT 5'5, Wt 242    Physical Exam (Telephone encounter)    ASSESSMENT:  1. Type 2 diabetes mellitus without complication, without long-term current use of insulin ([facility] [address]    2. Essential hypertension    3. Hyperlipidemia, unspecified hyperlipidemia type    4. Depression, unspecified depression type    5. Abnormal urinalysis        PLAN:    Orders Placed This Encounter   Procedures    URINE RT REFLEX TO CULTURE   Labs reviewed. Recheck Ua  Continue medications  ADR's explained  The patient is asked to make an attempt to improve diet and exercise patterns to aid in medical management of this problem. Pt to come in to give a urine sample    He/She and or healthcare decision maker is aware that a bill may be received for this telephone service, depending on insurance coverage. Verbal consent has been given to proceed: Yes    I affirm this is a Patient Initiated Episode with a patient who has not had a related appointment within my department in the past 7 days or scheduled within the next 24 hours    Patient identification was verified at the start of the visit: Yes    Total time: 18 min         Return for Follow up in 5 to 6 month.     Electronically Signed by Raymundo Blackwell DO

## 2021-10-04 DIAGNOSIS — E11.9 TYPE 2 DIABETES MELLITUS WITHOUT COMPLICATION, WITHOUT LONG-TERM CURRENT USE OF INSULIN (HCC): ICD-10-CM

## 2021-10-04 RX ORDER — METFORMIN HYDROCHLORIDE 500 MG/1
TABLET, EXTENDED RELEASE ORAL
Qty: 90 TABLET | Refills: 0 | Status: SHIPPED | OUTPATIENT
Start: 2021-10-04 | End: 2022-01-03

## 2021-11-21 DIAGNOSIS — I10 ESSENTIAL HYPERTENSION: ICD-10-CM

## 2021-11-22 RX ORDER — DILTIAZEM HYDROCHLORIDE 300 MG/1
CAPSULE, COATED, EXTENDED RELEASE ORAL
Qty: 90 CAPSULE | Refills: 0 | Status: SHIPPED | OUTPATIENT
Start: 2021-11-22 | End: 2022-03-28

## 2022-01-02 DIAGNOSIS — E11.9 TYPE 2 DIABETES MELLITUS WITHOUT COMPLICATION, WITHOUT LONG-TERM CURRENT USE OF INSULIN (HCC): ICD-10-CM

## 2022-01-03 RX ORDER — METFORMIN HYDROCHLORIDE 500 MG/1
TABLET, EXTENDED RELEASE ORAL
Qty: 90 TABLET | Refills: 0 | Status: SHIPPED | OUTPATIENT
Start: 2022-01-03 | End: 2022-05-09

## 2022-01-24 RX ORDER — HYDROCHLOROTHIAZIDE 25 MG/1
TABLET ORAL
Qty: 90 TABLET | Refills: 0 | Status: SHIPPED | OUTPATIENT
Start: 2022-01-24 | End: 2022-01-25 | Stop reason: SDUPTHER

## 2022-01-25 RX ORDER — HYDROCHLOROTHIAZIDE 25 MG/1
25 TABLET ORAL DAILY
Qty: 90 TABLET | Refills: 0 | Status: SHIPPED | OUTPATIENT
Start: 2022-01-25 | End: 2022-07-11 | Stop reason: SDUPTHER

## 2022-02-07 ENCOUNTER — TELEMEDICINE (OUTPATIENT)
Dept: INTERNAL MEDICINE CLINIC | Age: 47
End: 2022-02-07
Payer: COMMERCIAL

## 2022-02-07 DIAGNOSIS — I10 ESSENTIAL HYPERTENSION: ICD-10-CM

## 2022-02-07 DIAGNOSIS — E11.9 TYPE 2 DIABETES MELLITUS WITHOUT COMPLICATION, WITHOUT LONG-TERM CURRENT USE OF INSULIN (HCC): Primary | ICD-10-CM

## 2022-02-07 DIAGNOSIS — F32.A DEPRESSION, UNSPECIFIED DEPRESSION TYPE: ICD-10-CM

## 2022-02-07 DIAGNOSIS — E78.5 HYPERLIPIDEMIA, UNSPECIFIED HYPERLIPIDEMIA TYPE: ICD-10-CM

## 2022-02-07 PROCEDURE — 99213 OFFICE O/P EST LOW 20 MIN: CPT | Performed by: FAMILY MEDICINE

## 2022-02-07 ASSESSMENT — ENCOUNTER SYMPTOMS
BACK PAIN: 0
SHORTNESS OF BREATH: 0
ABDOMINAL PAIN: 0
COUGH: 0
NAUSEA: 0
CHEST TIGHTNESS: 0

## 2022-02-07 NOTE — PROGRESS NOTES
2022    TELEHEALTH EVALUATION -- Audio/Visual (During ALOFS-85 public health emergency)    HPI:    Danny Mesa (:  1975) has requested an audio/video evaluation for the following concern(s):  Patient Active Problem List    Diagnosis Date Noted    Type 2 diabetes mellitus ([facility] [address]     Depression     Essential hypertension     Hyperlipidemia      DM II- - elevated for her. On Metformin  HTN- Diltiazem and Losartan  HLD- On Lipitor  Depression- On Prozac  She has became sick a few times after receiving her Covid vaccinations. Currently stable    Review of Systems   Constitutional: Negative for diaphoresis and fever. Respiratory: Negative for cough, chest tightness and shortness of breath. Cardiovascular: Negative for chest pain and palpitations. Gastrointestinal: Negative for abdominal pain and nausea. Genitourinary: Negative for difficulty urinating. Musculoskeletal: Negative for back pain. Neurological: Negative for dizziness and headaches. Psychiatric/Behavioral: Negative for dysphoric mood. Prior to Visit Medications    Medication Sig Taking?  Authorizing Provider   hydroCHLOROthiazide (HYDRODIURIL) 25 MG tablet Take 1 tablet by mouth daily Yes Tavares Conteh DO   metFORMIN (GLUCOPHAGE-XR) 500 MG extended release tablet TAKE 1 TABLET BY MOUTH EVERY DAY WITH BREAKFAST Yes Katrina Braun DO   dilTIAZem (CARDIZEM CD) 300 MG extended release capsule TAKE 1 CAPSULE BY MOUTH EVERY DAY Yes Katrina Braun DO   atorvastatin (LIPITOR) 20 MG tablet TAKE 1 TABLET BY MOUTH EVERY DAY Yes Katrina Braun DO   FLUoxetine (PROZAC) 20 MG capsule TAKE 1 CAPSULE BY MOUTH EVERY DAY Yes Katrina Braun DO   losartan (COZAAR) 100 MG tablet TAKE 1 TABLET BY MOUTH EVERY DAY Yes Tavares Conteh DO       Social History     Tobacco Use    Smoking status: Never Smoker    Smokeless tobacco: Never Used   Vaping Use    Vaping Use: Never used   Substance Use Topics    Alcohol use: Yes     Comment: rarely; once year    Drug use: No        Past Medical History:   Diagnosis Date    Depression     Essential hypertension     Hyperlipidemia     Major depressive disorder, single episode, unspecified     Obesity     Obesity, unspecified     Type 2 diabetes mellitus (HCC)        PHYSICAL EXAMINATION:  [ INSTRUCTIONS:  \"[x]\" Indicates a positive item  \"[]\" Indicates a negative item  -- DELETE ALL ITEMS NOT EXAMINED]  Vital Signs: (As obtained by patient/caregiver or practitioner observation)    Blood pressure-132/92      Constitutional: [x] Appears well-developed and well-nourished [x] No apparent distress      [] Abnormal-   Mental status  [x] Alert and awake  [x] Oriented to person/place/time [x]Able to follow commands      Eyes:  EOM    [x]  Normal  [] Abnormal-  Sclera  [x]  Normal  [] Abnormal -         Discharge []  None visible  [] Abnormal -    HENT:   [x] Normocephalic, atraumatic. [] Abnormal   [] Mouth/Throat: Mucous membranes are moist.     External Ears [x] Normal  [] Abnormal-     Neck: [x] No visualized mass     Pulmonary/Chest: [x] Respiratory effort normal.  [x] No visualized signs of difficulty breathing or respiratory distress        [] Abnormal-      Musculoskeletal:   [] Normal gait with no signs of ataxia         [x] Normal range of motion of neck        [] Abnormal-       Neurological:        [x] No Facial Asymmetry (Cranial nerve 7 motor function) (limited exam to video visit)          [] No gaze palsy        [] Abnormal-         Skin:        [x] No significant exanthematous lesions or discoloration noted on facial skin         [] Abnormal-            Psychiatric:       [x] Normal Affect [] No Hallucinations        [] Abnormal-     Other pertinent observable physical exam findings-     ASSESSMENT/PLAN:  1. Type 2 diabetes mellitus without complication, without long-term current use of insulin ([facility] [address] chronic    - Comprehensive Metabolic Panel; Future  - Hemoglobin A1C; Future    2.  Essential hypertension, chronic    - CBC Auto Differential; Future    3. Hyperlipidemia, unspecified hyperlipidemia type    4. Depression, unspecified depression type    Continue medications  The patient is asked to make an attempt to improve diet and exercise patterns     Return in about 5 months (around 7/7/2022) for Diabetes, HTN. Sandra Wilson, was evaluated through a synchronous (real-time) audio-video encounter. The patient (or guardian if applicable) is aware that this is a billable service, which includes applicable co-pays. This Virtual Visit was conducted with patient's (and/or legal guardian's) consent. The visit was conducted pursuant to the emergency declaration under the [address] [place] [address], [address] [place] [address] authority and the [employer] and [employer] Act. Patient identification was verified, and a caregiver was present when appropriate. The patient was located at home in a state where the provider was licensed to provide care. Total time spent on this encounter: 20 min    --Esperanza Fernandez DO    An electronic signature was used to authenticate this note.

## 2022-02-09 RX ORDER — LOSARTAN POTASSIUM 100 MG/1
100 TABLET ORAL DAILY
Qty: 90 TABLET | Refills: 1 | Status: SHIPPED | OUTPATIENT
Start: 2022-02-09 | End: 2022-07-11 | Stop reason: SDUPTHER

## 2022-03-28 DIAGNOSIS — I10 ESSENTIAL HYPERTENSION: ICD-10-CM

## 2022-03-28 RX ORDER — DILTIAZEM HYDROCHLORIDE 300 MG/1
CAPSULE, COATED, EXTENDED RELEASE ORAL
Qty: 90 CAPSULE | Refills: 1 | Status: SHIPPED | OUTPATIENT
Start: 2022-03-28 | End: 2022-06-29 | Stop reason: SDUPTHER

## 2022-05-08 DIAGNOSIS — E11.9 TYPE 2 DIABETES MELLITUS WITHOUT COMPLICATION, WITHOUT LONG-TERM CURRENT USE OF INSULIN (HCC): ICD-10-CM

## 2022-05-09 RX ORDER — METFORMIN HYDROCHLORIDE 500 MG/1
TABLET, EXTENDED RELEASE ORAL
Qty: 90 TABLET | Refills: 0 | Status: SHIPPED | OUTPATIENT
Start: 2022-05-09

## 2022-06-29 DIAGNOSIS — F32.A DEPRESSION, UNSPECIFIED DEPRESSION TYPE: ICD-10-CM

## 2022-06-29 DIAGNOSIS — I10 ESSENTIAL HYPERTENSION: ICD-10-CM

## 2022-06-29 DIAGNOSIS — E78.5 HYPERLIPIDEMIA, UNSPECIFIED HYPERLIPIDEMIA TYPE: ICD-10-CM

## 2022-06-30 RX ORDER — FLUOXETINE HYDROCHLORIDE 20 MG/1
20 CAPSULE ORAL DAILY
Qty: 90 CAPSULE | Refills: 0 | Status: SHIPPED | OUTPATIENT
Start: 2022-06-30 | End: 2022-10-04

## 2022-06-30 RX ORDER — DILTIAZEM HYDROCHLORIDE 300 MG/1
300 CAPSULE, COATED, EXTENDED RELEASE ORAL DAILY
Qty: 90 CAPSULE | Refills: 0 | Status: SHIPPED | OUTPATIENT
Start: 2022-06-30

## 2022-06-30 RX ORDER — ATORVASTATIN CALCIUM 20 MG/1
20 TABLET, FILM COATED ORAL DAILY
Qty: 90 TABLET | Refills: 0 | Status: SHIPPED | OUTPATIENT
Start: 2022-06-30 | End: 2022-10-04

## 2022-07-06 DIAGNOSIS — F32.A DEPRESSION, UNSPECIFIED DEPRESSION TYPE: ICD-10-CM

## 2022-07-06 RX ORDER — FLUOXETINE HYDROCHLORIDE 20 MG/1
20 CAPSULE ORAL DAILY
Qty: 90 CAPSULE | Refills: 0 | OUTPATIENT
Start: 2022-07-06

## 2022-07-11 ENCOUNTER — OFFICE VISIT (OUTPATIENT)
Dept: INTERNAL MEDICINE CLINIC | Age: 47
End: 2022-07-11
Payer: COMMERCIAL

## 2022-07-11 VITALS
RESPIRATION RATE: 16 BRPM | OXYGEN SATURATION: 99 % | SYSTOLIC BLOOD PRESSURE: 126 MMHG | HEART RATE: 90 BPM | BODY MASS INDEX: 40.84 KG/M2 | DIASTOLIC BLOOD PRESSURE: 60 MMHG | HEIGHT: 65 IN | WEIGHT: 245.13 LBS

## 2022-07-11 DIAGNOSIS — E78.5 HYPERLIPIDEMIA, UNSPECIFIED HYPERLIPIDEMIA TYPE: ICD-10-CM

## 2022-07-11 DIAGNOSIS — I10 ESSENTIAL HYPERTENSION: ICD-10-CM

## 2022-07-11 DIAGNOSIS — E11.9 TYPE 2 DIABETES MELLITUS WITHOUT COMPLICATION, WITHOUT LONG-TERM CURRENT USE OF INSULIN (HCC): Primary | ICD-10-CM

## 2022-07-11 DIAGNOSIS — F32.A DEPRESSION, UNSPECIFIED DEPRESSION TYPE: ICD-10-CM

## 2022-07-11 PROCEDURE — 99214 OFFICE O/P EST MOD 30 MIN: CPT | Performed by: FAMILY MEDICINE

## 2022-07-11 RX ORDER — LOSARTAN POTASSIUM 100 MG/1
100 TABLET ORAL DAILY
Qty: 90 TABLET | Refills: 0 | Status: SHIPPED | OUTPATIENT
Start: 2022-07-11

## 2022-07-11 RX ORDER — HYDROCHLOROTHIAZIDE 25 MG/1
25 TABLET ORAL DAILY
Qty: 90 TABLET | Refills: 0 | Status: SHIPPED | OUTPATIENT
Start: 2022-07-11 | End: 2022-10-06

## 2022-07-11 SDOH — ECONOMIC STABILITY: FOOD INSECURITY: WITHIN THE PAST 12 MONTHS, YOU WORRIED THAT YOUR FOOD WOULD RUN OUT BEFORE YOU GOT MONEY TO BUY MORE.: PATIENT DECLINED

## 2022-07-11 SDOH — ECONOMIC STABILITY: FOOD INSECURITY: WITHIN THE PAST 12 MONTHS, THE FOOD YOU BOUGHT JUST DIDN'T LAST AND YOU DIDN'T HAVE MONEY TO GET MORE.: PATIENT DECLINED

## 2022-07-11 ASSESSMENT — ENCOUNTER SYMPTOMS
COUGH: 0
NAUSEA: 0
ABDOMINAL PAIN: 0
SHORTNESS OF BREATH: 0

## 2022-07-11 ASSESSMENT — SOCIAL DETERMINANTS OF HEALTH (SDOH): HOW HARD IS IT FOR YOU TO PAY FOR THE VERY BASICS LIKE FOOD, HOUSING, MEDICAL CARE, AND HEATING?: PATIENT DECLINED

## 2022-07-11 ASSESSMENT — PATIENT HEALTH QUESTIONNAIRE - PHQ9: DEPRESSION UNABLE TO ASSESS: PT REFUSES

## 2022-07-11 NOTE — PROGRESS NOTES
Fidelina Cornejo (:  1975) is a 56 y.o. female,established patient, here for evaluation of the following chief complaint(s):  Follow-up (5 month check), Diabetes, and Hypertension         ASSESSMENT/PLAN:  1. Type 2 diabetes mellitus without complication, without long-term current use of insulin (HCC)  Chronic  Continue metformin  - Comprehensive Metabolic Panel; Future  - Hemoglobin A1C; Future  - Urinalysis with Reflex to Culture; Future  - Microalbumin / Creatinine Urine Ratio; Future    2. Essential hypertension  Chronic  - CBC with Auto Differential; Future  - Comprehensive Metabolic Panel; Future  - Urinalysis with Reflex to Culture; Future  - hydroCHLOROthiazide (HYDRODIURIL) 25 MG tablet; Take 1 tablet by mouth daily  Dispense: 90 tablet; Refill: 0  - losartan (COZAAR) 100 MG tablet; Take 1 tablet by mouth daily  Dispense: 90 tablet; Refill: 0  Continue diltiazem    3. Hyperlipidemia, unspecified hyperlipidemia type  Continue Lipitor  - Lipid Panel; Future    4. Depression, unspecified depression type  Continue Prozac    Obtain labs now  The patient is asked to make an attempt to improve diet and exercise patterns   Recommend all preventative screens  On this date 2022 I have spent 30 minutes reviewing previous notes, test results and face to face with the patient discussing the diagnosis and importance of compliance with the treatment plan as well as documenting on the day of the visit. Return in about 5 months (around 2022) for Diabetes, HLD, HTN.        No results found for: WBC, HGB, HCT, MCV, PLT  Lab Results   Component Value Date    CHOL 196 2021     Lab Results   Component Value Date    TRIG 161 2021     Lab Results   Component Value Date    HDL 36 2021     Lab Results   Component Value Date    LDLCHOLESTEROL 94 2020    LDLCALC 128 2021     Lab Results   Component Value Date    LABA1C 6.5 2021     No results found for: EAG  Lab Results Component Value Date     05/01/2021    K 4.4 05/01/2021     05/01/2021    CO2 24 05/01/2021    BUN 8 05/01/2021    CREATININE 0.8 05/01/2021    GLUCOSE 142 05/01/2021    CALCIUM 9.4 05/01/2021    LABALBU 4.2 05/01/2021    BILITOT 0.3 05/01/2021    ALKPHOS 85 05/01/2021    AST 17 05/01/2021    ALT 16 05/01/2021     No results found for: TSHFT4, TSH  Lab Results   Component Value Date/Time    COLORU Yellow 05/01/2021 12:00 AM    LABSPEC 1.015 05/01/2021 12:00 AM    NITRU Negative 05/01/2021 12:00 AM    GLUCOSEU Negative 05/01/2021 12:00 AM    KETUA Negative 05/01/2021 12:00 AM    UROBILINOGEN Normal 05/01/2021 12:00 AM    BILIRUBINUR Negative 05/01/2021 12:00 AM       Subjective   SUBJECTIVE/OBJECTIVE:    HISTORY OF PRESENT ILLNESS:  This is a 56 y.o. female here for the following:  Patient Active Problem List    Diagnosis Date Noted    Type 2 diabetes mellitus ([facility] [address]     Depression     Essential hypertension     Hyperlipidemia       Diabetes Mellitus type II-patient feels that this has been controlled on metformin. She did not do labs. +Wt gain  Hypertension- stable on losartan, hydrochlorothiazide, and diltiazem  Hyperlipidemia-on Lipitor 20 mg  Depression -stable on Prozac    Review of Systems   Constitutional:  Negative for diaphoresis and fever. Respiratory:  Negative for cough and shortness of breath. Cardiovascular:  Negative for chest pain and palpitations. Gastrointestinal:  Negative for abdominal pain and nausea. Genitourinary:  Negative for difficulty urinating. Neurological:  Negative for dizziness and headaches. Psychiatric/Behavioral:  Negative for dysphoric mood.       No Known Allergies  Current Outpatient Medications   Medication Sig Dispense Refill    hydroCHLOROthiazide (HYDRODIURIL) 25 MG tablet Take 1 tablet by mouth daily 90 tablet 0    losartan (COZAAR) 100 MG tablet Take 1 tablet by mouth daily 90 tablet 0    atorvastatin (LIPITOR) 20 MG tablet Take 1 tablet by mouth daily 90 tablet 0    FLUoxetine (PROZAC) 20 MG capsule Take 1 capsule by mouth daily 90 capsule 0    dilTIAZem (CARDIZEM CD) 300 MG extended release capsule Take 1 capsule by mouth daily 90 capsule 0    metFORMIN (GLUCOPHAGE-XR) 500 MG extended release tablet TAKE 1 TABLET BY MOUTH EVERY DAY WITH BREAKFAST 90 tablet 0     No current facility-administered medications for this visit. Vitals:    07/11/22 1549   BP: 126/60   Pulse: 90   Resp: 16   SpO2: 99%   Weight: 245 lb 2 oz (111.2 kg)   Height: 5' 5\" (1.651 m)     Objective   Physical Exam  Vitals reviewed. Constitutional:       General: She is not in acute distress. Cardiovascular:      Rate and Rhythm: Normal rate and regular rhythm. Pulmonary:      Effort: Pulmonary effort is normal. No respiratory distress. Breath sounds: Normal breath sounds. Abdominal:      Palpations: Abdomen is soft. Tenderness: There is no abdominal tenderness. Musculoskeletal:      Cervical back: Neck supple. Right lower leg: No edema. Left lower leg: No edema. Neurological:      Mental Status: She is alert and oriented to person, place, and time. Psychiatric:         Mood and Affect: Mood normal.              An electronic signature was used to authenticate this note. --Neetu Lainez DO     This dictation was generated by voice recognition computer software. Although all attempts are made to edit the dictation for accuracy, there may be errors in the transcription that are not intended.

## 2022-07-24 ENCOUNTER — TELEPHONE (OUTPATIENT)
Dept: INTERNAL MEDICINE CLINIC | Age: 47
End: 2022-07-24

## 2022-07-24 NOTE — TELEPHONE ENCOUNTER
Call patient and inform her to obtain the lab orders that I gave her at the last visit now.  She can come in to the office for lab draw if needed

## 2022-07-25 NOTE — TELEPHONE ENCOUNTER
Called pt & informed her about going to get her labs done. Pt voiced she is getting them done on Saturday (July 30th).

## 2022-07-30 LAB
ALBUMIN SERPL-MCNC: 4.4 G/DL
ALP BLD-CCNC: 82 U/L
ALT SERPL-CCNC: 28 U/L
ANION GAP SERPL CALCULATED.3IONS-SCNC: 1.5 MMOL/L
AST SERPL-CCNC: 19 U/L
AVERAGE GLUCOSE: NORMAL
BASOPHILS ABSOLUTE: 0.1 /ΜL
BASOPHILS RELATIVE PERCENT: 0.6 %
BILIRUB SERPL-MCNC: 0.3 MG/DL (ref 0.1–1.4)
BUN BLDV-MCNC: 7 MG/DL
CALCIUM SERPL-MCNC: 9.6 MG/DL
CHLORIDE BLD-SCNC: 100 MMOL/L
CHOLESTEROL, TOTAL: 164 MG/DL
CHOLESTEROL/HDL RATIO: NORMAL
CO2: 24 MMOL/L
CREAT SERPL-MCNC: 0.8 MG/DL
EOSINOPHILS ABSOLUTE: 0.1 /ΜL
EOSINOPHILS RELATIVE PERCENT: 1 %
GFR CALCULATED: 92
GLUCOSE BLD-MCNC: 174 MG/DL
HBA1C MFR BLD: 7.7 %
HCT VFR BLD CALC: 39.4 % (ref 36–46)
HDLC SERPL-MCNC: 36 MG/DL (ref 35–70)
HEMOGLOBIN: 12.6 G/DL (ref 12–16)
LDL CHOLESTEROL CALCULATED: 90 MG/DL (ref 0–160)
LYMPHOCYTES ABSOLUTE: 2.9 /ΜL
LYMPHOCYTES RELATIVE PERCENT: 26.5 %
MCH RBC QN AUTO: 25.6 PG
MCHC RBC AUTO-ENTMCNC: 32 G/DL
MCV RBC AUTO: 80.1 FL
MONOCYTES ABSOLUTE: 0.6 /ΜL
MONOCYTES RELATIVE PERCENT: 5.7 %
NEUTROPHILS ABSOLUTE: 7.2 /ΜL
NEUTROPHILS RELATIVE PERCENT: 66 %
NONHDLC SERPL-MCNC: NORMAL MG/DL
PDW BLD-RTO: 16 %
PLATELET # BLD: 364 K/ΜL
PMV BLD AUTO: 11.9 FL
POTASSIUM SERPL-SCNC: 4.7 MMOL/L
RBC # BLD: 4.92 10^6/ΜL
SODIUM BLD-SCNC: 135 MMOL/L
TOTAL PROTEIN: 7.3
TRIGL SERPL-MCNC: 189 MG/DL
VLDLC SERPL CALC-MCNC: 38 MG/DL
WBC # BLD: 10.9 10^3/ML

## 2022-08-08 ENCOUNTER — TELEPHONE (OUTPATIENT)
Dept: INTERNAL MEDICINE CLINIC | Age: 47
End: 2022-08-08

## 2022-08-09 NOTE — TELEPHONE ENCOUNTER
Called patient and informed of results. Patient voiced understanding. Pt had covid and was unable to bring in her UA. She is bringing it in this week. Just ODILIA. Thank you.

## 2022-08-09 NOTE — TELEPHONE ENCOUNTER
Compunet labs: Glucose elevated, Hemoglobin A1c is now 7.7 not controlled.   I suggest she increase the Metformin to twice a day and monitor her blood sugar closely, Triglycerides slightly elevated, otherwise cholesterol normal, electrolytes, liver and kidney function normal, wbc no, no anemia

## 2022-08-10 DIAGNOSIS — I10 ESSENTIAL HYPERTENSION: ICD-10-CM

## 2022-08-10 DIAGNOSIS — E11.9 TYPE 2 DIABETES MELLITUS WITHOUT COMPLICATION, WITHOUT LONG-TERM CURRENT USE OF INSULIN (HCC): ICD-10-CM

## 2022-08-10 DIAGNOSIS — E78.5 HYPERLIPIDEMIA, UNSPECIFIED HYPERLIPIDEMIA TYPE: ICD-10-CM

## 2022-10-04 DIAGNOSIS — F32.A DEPRESSION, UNSPECIFIED DEPRESSION TYPE: ICD-10-CM

## 2022-10-04 DIAGNOSIS — E78.5 HYPERLIPIDEMIA, UNSPECIFIED HYPERLIPIDEMIA TYPE: ICD-10-CM

## 2022-10-04 RX ORDER — ATORVASTATIN CALCIUM 20 MG/1
TABLET, FILM COATED ORAL
Qty: 90 TABLET | Refills: 0 | Status: SHIPPED | OUTPATIENT
Start: 2022-10-04

## 2022-10-04 RX ORDER — FLUOXETINE HYDROCHLORIDE 20 MG/1
CAPSULE ORAL
Qty: 90 CAPSULE | Refills: 0 | Status: SHIPPED | OUTPATIENT
Start: 2022-10-04

## 2022-10-06 DIAGNOSIS — I10 ESSENTIAL HYPERTENSION: ICD-10-CM

## 2022-10-06 RX ORDER — HYDROCHLOROTHIAZIDE 25 MG/1
TABLET ORAL
Qty: 90 TABLET | Refills: 0 | Status: SHIPPED | OUTPATIENT
Start: 2022-10-06

## 2023-04-06 PROBLEM — F33.1 MAJOR DEPRESSIVE DISORDER, RECURRENT, MODERATE (HCC): Status: ACTIVE | Noted: 2023-04-06

## 2023-04-08 DIAGNOSIS — I10 ESSENTIAL HYPERTENSION: ICD-10-CM

## 2023-04-10 RX ORDER — HYDROCHLOROTHIAZIDE 25 MG/1
TABLET ORAL
Qty: 90 TABLET | Refills: 0 | OUTPATIENT
Start: 2023-04-10

## 2023-04-10 RX ORDER — LOSARTAN POTASSIUM 100 MG/1
TABLET ORAL
Qty: 90 TABLET | Refills: 0 | OUTPATIENT
Start: 2023-04-10

## 2023-06-17 LAB
ALBUMIN SERPL-MCNC: 4.1 G/DL
ALP BLD-CCNC: 82 U/L
ALT SERPL-CCNC: 28 U/L
ANION GAP SERPL CALCULATED.3IONS-SCNC: 1.3 MMOL/L
AST SERPL-CCNC: 20 U/L
AVERAGE GLUCOSE: NORMAL
BASOPHILS ABSOLUTE: 0.1 /ΜL
BASOPHILS RELATIVE PERCENT: 0.5 %
BILIRUB SERPL-MCNC: 0.3 MG/DL (ref 0.1–1.4)
BUN BLDV-MCNC: 6 MG/DL
CALCIUM SERPL-MCNC: 9.4 MG/DL
CHLORIDE BLD-SCNC: 95 MMOL/L
CHOLESTEROL, TOTAL: 191 MG/DL
CHOLESTEROL/HDL RATIO: ABNORMAL
CO2: 23 MMOL/L
CREAT SERPL-MCNC: 0.9 MG/DL
EGFR: 79
EOSINOPHILS ABSOLUTE: 0.1 /ΜL
EOSINOPHILS RELATIVE PERCENT: 0.8 %
FOLATE: 4.7
GLUCOSE BLD-MCNC: 179 MG/DL
HBA1C MFR BLD: 8.1 %
HCT VFR BLD CALC: 41.3 % (ref 36–46)
HDLC SERPL-MCNC: 31 MG/DL (ref 35–70)
HEMOGLOBIN: 13.6 G/DL (ref 12–16)
LDL CHOLESTEROL CALCULATED: 111 MG/DL (ref 0–160)
LYMPHOCYTES ABSOLUTE: 3.1 /ΜL
LYMPHOCYTES RELATIVE PERCENT: 28 %
MCH RBC QN AUTO: 24.8 PG
MCHC RBC AUTO-ENTMCNC: 32.9 G/DL
MCV RBC AUTO: 75.4 FL
MONOCYTES ABSOLUTE: 0.7 /ΜL
MONOCYTES RELATIVE PERCENT: 6.1 %
NEUTROPHILS ABSOLUTE: 7.1 /ΜL
NEUTROPHILS RELATIVE PERCENT: 64.2 %
NONHDLC SERPL-MCNC: ABNORMAL MG/DL
PDW BLD-RTO: 17.2 %
PLATELET # BLD: 400 K/ΜL
PMV BLD AUTO: 11.9 FL
POTASSIUM SERPL-SCNC: 4.3 MMOL/L
RBC # BLD: 5.48 10^6/ΜL
SODIUM BLD-SCNC: 131 MMOL/L
TOTAL PROTEIN: 7.3
TRIGL SERPL-MCNC: 243 MG/DL
TSH SERPL DL<=0.05 MIU/L-ACNC: 1.8 UIU/ML
VITAMIN B-12: 384
VLDLC SERPL CALC-MCNC: 49 MG/DL
WBC # BLD: 11 10^3/ML

## 2023-06-19 DIAGNOSIS — E78.5 HYPERLIPIDEMIA, UNSPECIFIED HYPERLIPIDEMIA TYPE: ICD-10-CM

## 2023-06-19 DIAGNOSIS — R41.3 MEMORY CHANGES: ICD-10-CM

## 2023-06-19 DIAGNOSIS — E11.9 TYPE 2 DIABETES MELLITUS WITHOUT COMPLICATION, WITHOUT LONG-TERM CURRENT USE OF INSULIN (HCC): ICD-10-CM

## 2023-06-19 DIAGNOSIS — I10 ESSENTIAL HYPERTENSION: ICD-10-CM

## 2023-06-22 ENCOUNTER — OFFICE VISIT (OUTPATIENT)
Dept: INTERNAL MEDICINE CLINIC | Age: 48
End: 2023-06-22
Payer: COMMERCIAL

## 2023-06-22 VITALS
SYSTOLIC BLOOD PRESSURE: 122 MMHG | HEIGHT: 65 IN | HEART RATE: 97 BPM | OXYGEN SATURATION: 98 % | DIASTOLIC BLOOD PRESSURE: 89 MMHG | WEIGHT: 217.8 LBS | BODY MASS INDEX: 36.29 KG/M2

## 2023-06-22 DIAGNOSIS — F33.1 MAJOR DEPRESSIVE DISORDER, RECURRENT, MODERATE (HCC): ICD-10-CM

## 2023-06-22 DIAGNOSIS — E11.65 TYPE 2 DIABETES MELLITUS WITH HYPERGLYCEMIA, WITHOUT LONG-TERM CURRENT USE OF INSULIN (HCC): Primary | ICD-10-CM

## 2023-06-22 DIAGNOSIS — R41.3 MEMORY CHANGES: ICD-10-CM

## 2023-06-22 DIAGNOSIS — F68.8 PERSONALITY CHANGE: ICD-10-CM

## 2023-06-22 DIAGNOSIS — I10 ESSENTIAL HYPERTENSION: ICD-10-CM

## 2023-06-22 DIAGNOSIS — E78.5 HYPERLIPIDEMIA, UNSPECIFIED HYPERLIPIDEMIA TYPE: ICD-10-CM

## 2023-06-22 DIAGNOSIS — R63.4 UNINTENTIONAL WEIGHT LOSS: ICD-10-CM

## 2023-06-22 DIAGNOSIS — F12.10 CANNABIS ABUSE: ICD-10-CM

## 2023-06-22 DIAGNOSIS — E87.1 HYPONATREMIA: ICD-10-CM

## 2023-06-22 PROBLEM — E66.01 SEVERE OBESITY (BMI 35.0-39.9) WITH COMORBIDITY (HCC): Status: ACTIVE | Noted: 2023-06-22

## 2023-06-22 PROCEDURE — 3074F SYST BP LT 130 MM HG: CPT | Performed by: FAMILY MEDICINE

## 2023-06-22 PROCEDURE — 3052F HG A1C>EQUAL 8.0%<EQUAL 9.0%: CPT | Performed by: FAMILY MEDICINE

## 2023-06-22 PROCEDURE — 3078F DIAST BP <80 MM HG: CPT | Performed by: FAMILY MEDICINE

## 2023-06-22 PROCEDURE — 99214 OFFICE O/P EST MOD 30 MIN: CPT | Performed by: FAMILY MEDICINE

## 2023-06-22 ASSESSMENT — ENCOUNTER SYMPTOMS
SHORTNESS OF BREATH: 0
ABDOMINAL PAIN: 0
COUGH: 0
NAUSEA: 0

## 2023-06-22 NOTE — PROGRESS NOTES
Rosanna Fleischer (:  1975) is a 53 y.o. female,established patient, here for evaluation of the following chief complaint(s):  Follow-up, Diabetes, and Hyperlipidemia         ASSESSMENT/PLAN:  1. Type 2 diabetes mellitus with hyperglycemia, without long-term current use of insulin (HCC)  Continue metformin and use daily  Monitor BS and if it remains up with increase metformin dose    2. Essential hypertension  Continue losartan 50    3. Hyperlipidemia, unspecified hyperlipidemia type  Continue Lipitor 20    4. Personality change    5. Memory changes  Denies further evaluation    6. Major depressive disorder, recurrent, moderate  Continue Prozac 20    7. Hyponatremia  - OSMOLALITY; Future  - Electrolyte Panel; Future    8. Unintentional weight loss  Declines further evaluation    9. Cannabis abuse  Recommend she consider Chanel Tao     Declines preventative screens  On this date 2023 I have spent 30 minutes reviewing previous notes, test results and face to face with the patient discussing the diagnosis and importance of compliance with the treatment plan as well as documenting on the day of the visit. Return in about 5 months (around 2023) for Diabetes, Depression.        Lab Results   Component Value Date    WBC 11.0 2023    HGB 13.6 2023    HCT 41.3 2023    MCV 75.4 2023     2023     Lab Results   Component Value Date    CHOL 191 2023     Lab Results   Component Value Date    TRIG 243 2023     Lab Results   Component Value Date    HDL 31 (A) 2023     Lab Results   Component Value Date    LDLCHOLESTEROL 94 2020    LDLCALC 111 2023     Lab Results   Component Value Date    LABA1C 8.1 2023       Lab Results   Component Value Date     2023    K 4.3 2023    CL 95 2023    CO2 23 2023    BUN 6 2023    CREATININE 0.9 2023    GLUCOSE 179 2023    CALCIUM 9.4 2023    LABALBU

## 2023-06-25 PROBLEM — F12.10 CANNABIS ABUSE: Status: ACTIVE | Noted: 2023-06-25

## 2023-06-25 ASSESSMENT — ENCOUNTER SYMPTOMS: BACK PAIN: 0

## 2023-07-29 DIAGNOSIS — E11.9 TYPE 2 DIABETES MELLITUS WITHOUT COMPLICATION, WITHOUT LONG-TERM CURRENT USE OF INSULIN (HCC): ICD-10-CM

## 2023-07-29 DIAGNOSIS — E78.5 HYPERLIPIDEMIA, UNSPECIFIED HYPERLIPIDEMIA TYPE: ICD-10-CM

## 2023-07-29 DIAGNOSIS — I10 ESSENTIAL HYPERTENSION: ICD-10-CM

## 2023-07-31 RX ORDER — ATORVASTATIN CALCIUM 20 MG/1
TABLET, FILM COATED ORAL
Qty: 90 TABLET | Refills: 0 | Status: SHIPPED | OUTPATIENT
Start: 2023-07-31

## 2023-07-31 RX ORDER — METFORMIN HYDROCHLORIDE 500 MG/1
TABLET, EXTENDED RELEASE ORAL
Qty: 90 TABLET | Refills: 0 | Status: SHIPPED | OUTPATIENT
Start: 2023-07-31

## 2023-07-31 RX ORDER — LOSARTAN POTASSIUM 50 MG/1
TABLET ORAL
Qty: 90 TABLET | Refills: 0 | Status: SHIPPED | OUTPATIENT
Start: 2023-07-31

## 2023-08-25 ENCOUNTER — HOSPITAL ENCOUNTER (INPATIENT)
Age: 48
LOS: 1 days | Discharge: ANOTHER HOSPITAL | DRG: 054 | End: 2023-08-26
Attending: STUDENT IN AN ORGANIZED HEALTH CARE EDUCATION/TRAINING PROGRAM | Admitting: STUDENT IN AN ORGANIZED HEALTH CARE EDUCATION/TRAINING PROGRAM
Payer: COMMERCIAL

## 2023-08-25 ENCOUNTER — APPOINTMENT (OUTPATIENT)
Dept: CT IMAGING | Age: 48
DRG: 054 | End: 2023-08-25
Payer: COMMERCIAL

## 2023-08-25 DIAGNOSIS — G93.89 LEFT FRONTAL LOBE MASS: Primary | ICD-10-CM

## 2023-08-25 DIAGNOSIS — R41.82 ALTERED MENTAL STATUS, UNSPECIFIED ALTERED MENTAL STATUS TYPE: ICD-10-CM

## 2023-08-25 PROBLEM — D49.6 BRAIN TUMOR (HCC): Status: ACTIVE | Noted: 2023-08-25

## 2023-08-25 LAB
ACETAMINOPHEN LEVEL: <5 UG/ML (ref 15–30)
ALBUMIN SERPL-MCNC: 4.4 GM/DL (ref 3.4–5)
ALCOHOL SCREEN SERUM: <0.01 %WT/VOL
ALP BLD-CCNC: 70 IU/L (ref 40–129)
ALT SERPL-CCNC: 35 U/L (ref 10–40)
AMPHETAMINES: NEGATIVE
ANION GAP SERPL CALCULATED.3IONS-SCNC: 16 MMOL/L (ref 4–16)
AST SERPL-CCNC: 39 IU/L (ref 15–37)
BACTERIA: NEGATIVE /HPF
BARBITURATE SCREEN URINE: NEGATIVE
BASOPHILS ABSOLUTE: 0 K/CU MM
BASOPHILS RELATIVE PERCENT: 0.3 % (ref 0–1)
BENZODIAZEPINE SCREEN, URINE: NEGATIVE
BILIRUB SERPL-MCNC: 0.4 MG/DL (ref 0–1)
BILIRUBIN URINE: NEGATIVE MG/DL
BLOOD, URINE: ABNORMAL
BUN SERPL-MCNC: 11 MG/DL (ref 6–23)
CALCIUM SERPL-MCNC: 9.6 MG/DL (ref 8.3–10.6)
CANNABINOID SCREEN URINE: ABNORMAL
CHLORIDE BLD-SCNC: 96 MMOL/L (ref 99–110)
CLARITY: CLEAR
CO2: 22 MMOL/L (ref 21–32)
COCAINE METABOLITE: NEGATIVE
COLOR: YELLOW
CREAT SERPL-MCNC: 1 MG/DL (ref 0.6–1.1)
DIFFERENTIAL TYPE: ABNORMAL
DOSE AMOUNT: ABNORMAL
DOSE AMOUNT: ABNORMAL
DOSE TIME: ABNORMAL
DOSE TIME: ABNORMAL
EOSINOPHILS ABSOLUTE: 0 K/CU MM
EOSINOPHILS RELATIVE PERCENT: 0 % (ref 0–3)
FENTANYL URINE: NEGATIVE
GFR SERPL CREATININE-BSD FRML MDRD: >60 ML/MIN/1.73M2
GLUCOSE BLD-MCNC: 256 MG/DL (ref 70–99)
GLUCOSE SERPL-MCNC: 286 MG/DL (ref 70–99)
GLUCOSE, URINE: 100 MG/DL
HCT VFR BLD CALC: 43.3 % (ref 37–47)
HEMOGLOBIN: 14.1 GM/DL (ref 12.5–16)
IMMATURE NEUTROPHIL %: 0.3 % (ref 0–0.43)
INTERPRETATION: NORMAL
KETONES, URINE: 15 MG/DL
LEUKOCYTE ESTERASE, URINE: NEGATIVE
LIPASE: 19 IU/L (ref 13–60)
LYMPHOCYTES ABSOLUTE: 2.4 K/CU MM
LYMPHOCYTES RELATIVE PERCENT: 20.6 % (ref 24–44)
MCH RBC QN AUTO: 25.9 PG (ref 27–31)
MCHC RBC AUTO-ENTMCNC: 32.6 % (ref 32–36)
MCV RBC AUTO: 79.4 FL (ref 78–100)
MONOCYTES ABSOLUTE: 0.7 K/CU MM
MONOCYTES RELATIVE PERCENT: 5.6 % (ref 0–4)
MUCUS: ABNORMAL HPF
NITRITE URINE, QUANTITATIVE: NEGATIVE
NUCLEATED RBC %: 0 %
OPIATES, URINE: NEGATIVE
OXYCODONE: NEGATIVE
PDW BLD-RTO: 17 % (ref 11.7–14.9)
PH, URINE: 6 (ref 5–8)
PLATELET # BLD: 465 K/CU MM (ref 140–440)
PMV BLD AUTO: 11.2 FL (ref 7.5–11.1)
POTASSIUM SERPL-SCNC: 3.6 MMOL/L (ref 3.5–5.1)
PREGNANCY, URINE: NEGATIVE
PROTEIN UA: 30 MG/DL
RBC # BLD: 5.45 M/CU MM (ref 4.2–5.4)
RBC URINE: 1 /HPF (ref 0–6)
SALICYLATE LEVEL: <0.3 MG/DL (ref 15–30)
SEGMENTED NEUTROPHILS ABSOLUTE COUNT: 8.5 K/CU MM
SEGMENTED NEUTROPHILS RELATIVE PERCENT: 73.2 % (ref 36–66)
SODIUM BLD-SCNC: 134 MMOL/L (ref 135–145)
SPECIFIC GRAVITY UA: 1.02 (ref 1–1.03)
SQUAMOUS EPITHELIAL: 1 /HPF
TOTAL IMMATURE NEUTOROPHIL: 0.04 K/CU MM
TOTAL NUCLEATED RBC: 0 K/CU MM
TOTAL PROTEIN: 7.7 GM/DL (ref 6.4–8.2)
TRICHOMONAS: ABNORMAL /HPF
UROBILINOGEN, URINE: 0.2 MG/DL (ref 0.2–1)
WBC # BLD: 11.6 K/CU MM (ref 4–10.5)
WBC UA: 8 /HPF (ref 0–5)

## 2023-08-25 PROCEDURE — 99285 EMERGENCY DEPT VISIT HI MDM: CPT

## 2023-08-25 PROCEDURE — 2580000003 HC RX 258: Performed by: STUDENT IN AN ORGANIZED HEALTH CARE EDUCATION/TRAINING PROGRAM

## 2023-08-25 PROCEDURE — 51798 US URINE CAPACITY MEASURE: CPT

## 2023-08-25 PROCEDURE — 81001 URINALYSIS AUTO W/SCOPE: CPT

## 2023-08-25 PROCEDURE — 80307 DRUG TEST PRSMV CHEM ANLYZR: CPT

## 2023-08-25 PROCEDURE — 82962 GLUCOSE BLOOD TEST: CPT

## 2023-08-25 PROCEDURE — 6370000000 HC RX 637 (ALT 250 FOR IP): Performed by: STUDENT IN AN ORGANIZED HEALTH CARE EDUCATION/TRAINING PROGRAM

## 2023-08-25 PROCEDURE — 6360000002 HC RX W HCPCS: Performed by: STUDENT IN AN ORGANIZED HEALTH CARE EDUCATION/TRAINING PROGRAM

## 2023-08-25 PROCEDURE — 93005 ELECTROCARDIOGRAM TRACING: CPT | Performed by: PHYSICIAN ASSISTANT

## 2023-08-25 PROCEDURE — 2000000000 HC ICU R&B

## 2023-08-25 PROCEDURE — 70450 CT HEAD/BRAIN W/O DYE: CPT

## 2023-08-25 PROCEDURE — 6360000002 HC RX W HCPCS: Performed by: PHYSICIAN ASSISTANT

## 2023-08-25 PROCEDURE — G0480 DRUG TEST DEF 1-7 CLASSES: HCPCS

## 2023-08-25 PROCEDURE — 81025 URINE PREGNANCY TEST: CPT

## 2023-08-25 PROCEDURE — 76937 US GUIDE VASCULAR ACCESS: CPT

## 2023-08-25 PROCEDURE — 80053 COMPREHEN METABOLIC PANEL: CPT

## 2023-08-25 PROCEDURE — 85025 COMPLETE CBC W/AUTO DIFF WBC: CPT

## 2023-08-25 PROCEDURE — 83690 ASSAY OF LIPASE: CPT

## 2023-08-25 PROCEDURE — 96374 THER/PROPH/DIAG INJ IV PUSH: CPT

## 2023-08-25 PROCEDURE — 2580000003 HC RX 258: Performed by: PHYSICIAN ASSISTANT

## 2023-08-25 RX ORDER — ACETAMINOPHEN 650 MG/1
650 SUPPOSITORY RECTAL EVERY 6 HOURS PRN
Status: DISCONTINUED | OUTPATIENT
Start: 2023-08-25 | End: 2023-08-26 | Stop reason: HOSPADM

## 2023-08-25 RX ORDER — SODIUM CHLORIDE 0.9 % (FLUSH) 0.9 %
5-40 SYRINGE (ML) INJECTION EVERY 12 HOURS SCHEDULED
Status: DISCONTINUED | OUTPATIENT
Start: 2023-08-25 | End: 2023-08-26 | Stop reason: HOSPADM

## 2023-08-25 RX ORDER — FOLIC ACID 1 MG/1
1 TABLET ORAL DAILY
Status: DISCONTINUED | OUTPATIENT
Start: 2023-08-25 | End: 2023-08-26 | Stop reason: HOSPADM

## 2023-08-25 RX ORDER — DEXAMETHASONE SODIUM PHOSPHATE 10 MG/ML
10 INJECTION, SOLUTION INTRAMUSCULAR; INTRAVENOUS ONCE
Status: COMPLETED | OUTPATIENT
Start: 2023-08-25 | End: 2023-08-25

## 2023-08-25 RX ORDER — LEVETIRACETAM 10 MG/ML
1000 INJECTION INTRAVASCULAR EVERY 12 HOURS
Status: DISCONTINUED | OUTPATIENT
Start: 2023-08-25 | End: 2023-08-25 | Stop reason: CLARIF

## 2023-08-25 RX ORDER — SODIUM CHLORIDE 9 MG/ML
1000 INJECTION, SOLUTION INTRAVENOUS CONTINUOUS
Status: DISCONTINUED | OUTPATIENT
Start: 2023-08-25 | End: 2023-08-26 | Stop reason: HOSPADM

## 2023-08-25 RX ORDER — GLUCAGON 1 MG/ML
1 KIT INJECTION PRN
Status: DISCONTINUED | OUTPATIENT
Start: 2023-08-25 | End: 2023-08-26 | Stop reason: HOSPADM

## 2023-08-25 RX ORDER — INSULIN LISPRO 100 [IU]/ML
0-4 INJECTION, SOLUTION INTRAVENOUS; SUBCUTANEOUS NIGHTLY
Status: DISCONTINUED | OUTPATIENT
Start: 2023-08-25 | End: 2023-08-26

## 2023-08-25 RX ORDER — SODIUM CHLORIDE 0.9 % (FLUSH) 0.9 %
5-40 SYRINGE (ML) INJECTION PRN
Status: DISCONTINUED | OUTPATIENT
Start: 2023-08-25 | End: 2023-08-26 | Stop reason: HOSPADM

## 2023-08-25 RX ORDER — HALOPERIDOL 5 MG/ML
5 INJECTION INTRAMUSCULAR ONCE
Status: DISCONTINUED | OUTPATIENT
Start: 2023-08-25 | End: 2023-08-25

## 2023-08-25 RX ORDER — HYDRALAZINE HYDROCHLORIDE 20 MG/ML
10 INJECTION INTRAMUSCULAR; INTRAVENOUS
Status: DISCONTINUED | OUTPATIENT
Start: 2023-08-25 | End: 2023-08-26 | Stop reason: HOSPADM

## 2023-08-25 RX ORDER — POLYETHYLENE GLYCOL 3350 17 G/17G
17 POWDER, FOR SOLUTION ORAL DAILY PRN
Status: DISCONTINUED | OUTPATIENT
Start: 2023-08-25 | End: 2023-08-26 | Stop reason: HOSPADM

## 2023-08-25 RX ORDER — SODIUM CHLORIDE 1 G/1
1 TABLET ORAL
Status: DISCONTINUED | OUTPATIENT
Start: 2023-08-25 | End: 2023-08-26 | Stop reason: HOSPADM

## 2023-08-25 RX ORDER — ENOXAPARIN SODIUM 100 MG/ML
40 INJECTION SUBCUTANEOUS NIGHTLY
Status: DISCONTINUED | OUTPATIENT
Start: 2023-08-25 | End: 2023-08-26 | Stop reason: HOSPADM

## 2023-08-25 RX ORDER — NIFEDIPINE 30 MG/1
60 TABLET, EXTENDED RELEASE ORAL NIGHTLY
Status: DISCONTINUED | OUTPATIENT
Start: 2023-08-25 | End: 2023-08-26 | Stop reason: HOSPADM

## 2023-08-25 RX ORDER — LOSARTAN POTASSIUM 25 MG/1
50 TABLET ORAL DAILY
Status: DISCONTINUED | OUTPATIENT
Start: 2023-08-26 | End: 2023-08-26 | Stop reason: HOSPADM

## 2023-08-25 RX ORDER — 0.9 % SODIUM CHLORIDE 0.9 %
1000 INTRAVENOUS SOLUTION INTRAVENOUS ONCE
Status: COMPLETED | OUTPATIENT
Start: 2023-08-25 | End: 2023-08-25

## 2023-08-25 RX ORDER — ACETAMINOPHEN 325 MG/1
650 TABLET ORAL EVERY 6 HOURS PRN
Status: DISCONTINUED | OUTPATIENT
Start: 2023-08-25 | End: 2023-08-26 | Stop reason: HOSPADM

## 2023-08-25 RX ORDER — LANOLIN ALCOHOL/MO/W.PET/CERES
100 CREAM (GRAM) TOPICAL DAILY
Status: DISCONTINUED | OUTPATIENT
Start: 2023-08-28 | End: 2023-08-26 | Stop reason: HOSPADM

## 2023-08-25 RX ORDER — ONDANSETRON 4 MG/1
4 TABLET, ORALLY DISINTEGRATING ORAL EVERY 8 HOURS PRN
Status: DISCONTINUED | OUTPATIENT
Start: 2023-08-25 | End: 2023-08-26 | Stop reason: HOSPADM

## 2023-08-25 RX ORDER — ATORVASTATIN CALCIUM 40 MG/1
40 TABLET, FILM COATED ORAL NIGHTLY
Status: DISCONTINUED | OUTPATIENT
Start: 2023-08-25 | End: 2023-08-26 | Stop reason: HOSPADM

## 2023-08-25 RX ORDER — THIAMINE HYDROCHLORIDE 100 MG/ML
250 INJECTION, SOLUTION INTRAMUSCULAR; INTRAVENOUS 2 TIMES DAILY
Status: DISCONTINUED | OUTPATIENT
Start: 2023-08-25 | End: 2023-08-25

## 2023-08-25 RX ORDER — DEXTROSE MONOHYDRATE 100 MG/ML
INJECTION, SOLUTION INTRAVENOUS CONTINUOUS PRN
Status: DISCONTINUED | OUTPATIENT
Start: 2023-08-25 | End: 2023-08-26 | Stop reason: HOSPADM

## 2023-08-25 RX ORDER — LABETALOL HYDROCHLORIDE 5 MG/ML
10 INJECTION, SOLUTION INTRAVENOUS
Status: DISCONTINUED | OUTPATIENT
Start: 2023-08-25 | End: 2023-08-26 | Stop reason: HOSPADM

## 2023-08-25 RX ORDER — LANOLIN ALCOHOL/MO/W.PET/CERES
100 CREAM (GRAM) TOPICAL DAILY
Status: DISCONTINUED | OUTPATIENT
Start: 2023-08-28 | End: 2023-08-25

## 2023-08-25 RX ORDER — ONDANSETRON 2 MG/ML
4 INJECTION INTRAMUSCULAR; INTRAVENOUS EVERY 6 HOURS PRN
Status: DISCONTINUED | OUTPATIENT
Start: 2023-08-25 | End: 2023-08-26 | Stop reason: HOSPADM

## 2023-08-25 RX ORDER — INSULIN LISPRO 100 [IU]/ML
0-8 INJECTION, SOLUTION INTRAVENOUS; SUBCUTANEOUS
Status: DISCONTINUED | OUTPATIENT
Start: 2023-08-26 | End: 2023-08-26

## 2023-08-25 RX ORDER — M-VIT,TX,IRON,MINS/CALC/FOLIC 27MG-0.4MG
1 TABLET ORAL DAILY
Status: DISCONTINUED | OUTPATIENT
Start: 2023-08-25 | End: 2023-08-26 | Stop reason: HOSPADM

## 2023-08-25 RX ORDER — SODIUM CHLORIDE 9 MG/ML
500 INJECTION, SOLUTION INTRAVENOUS PRN
Status: DISCONTINUED | OUTPATIENT
Start: 2023-08-25 | End: 2023-08-26 | Stop reason: HOSPADM

## 2023-08-25 RX ORDER — DEXAMETHASONE SODIUM PHOSPHATE 4 MG/ML
4 INJECTION, SOLUTION INTRA-ARTICULAR; INTRALESIONAL; INTRAMUSCULAR; INTRAVENOUS; SOFT TISSUE EVERY 6 HOURS
Status: DISCONTINUED | OUTPATIENT
Start: 2023-08-26 | End: 2023-08-26 | Stop reason: HOSPADM

## 2023-08-25 RX ADMIN — SODIUM CHLORIDE 1000 ML: 9 INJECTION, SOLUTION INTRAVENOUS at 18:43

## 2023-08-25 RX ADMIN — NIFEDIPINE 60 MG: 30 TABLET, FILM COATED, EXTENDED RELEASE ORAL at 22:45

## 2023-08-25 RX ADMIN — LABETALOL HYDROCHLORIDE 10 MG: 5 INJECTION, SOLUTION INTRAVENOUS at 20:39

## 2023-08-25 RX ADMIN — THIAMINE HYDROCHLORIDE 250 MG: 100 INJECTION, SOLUTION INTRAMUSCULAR; INTRAVENOUS at 23:28

## 2023-08-25 RX ADMIN — SODIUM CHLORIDE 1000 ML: 9 INJECTION, SOLUTION INTRAVENOUS at 21:37

## 2023-08-25 RX ADMIN — SODIUM CHLORIDE 1 G: 1 TABLET ORAL at 22:44

## 2023-08-25 RX ADMIN — DEXAMETHASONE SODIUM PHOSPHATE 10 MG: 10 INJECTION, SOLUTION INTRAMUSCULAR; INTRAVENOUS at 18:43

## 2023-08-25 RX ADMIN — LEVETIRACETAM: 100 INJECTION, SOLUTION INTRAVENOUS at 20:43

## 2023-08-25 RX ADMIN — HYDRALAZINE HYDROCHLORIDE 10 MG: 20 INJECTION INTRAMUSCULAR; INTRAVENOUS at 23:49

## 2023-08-25 RX ADMIN — ENOXAPARIN SODIUM 40 MG: 100 INJECTION SUBCUTANEOUS at 22:58

## 2023-08-25 RX ADMIN — DEXAMETHASONE SODIUM PHOSPHATE 4 MG: 4 INJECTION, SOLUTION INTRAMUSCULAR; INTRAVENOUS at 23:48

## 2023-08-25 RX ADMIN — HYDRALAZINE HYDROCHLORIDE 10 MG: 20 INJECTION INTRAMUSCULAR; INTRAVENOUS at 20:41

## 2023-08-25 NOTE — ED TRIAGE NOTES
Pt denies HI/SI at this time. Pt  describes wife having \"OCD tendencies, staying in bed all day, and obsessive showering. \" Pt pink slipped by [facility].

## 2023-08-25 NOTE — ED NOTES
PT her via EMS with  at bedside. EMS reports pt has POC of 256 and hypertension. EMS states pt is non compliant with daily medications for those things. Asked Pt was was going on today and she stated \"I'm nuts\"  stated this has been going on for months and it's just getting worse. States pt has been acting weird and not remembering things. Pt denies SI/HI.       Mari Potter Buys  08/25/23 3341

## 2023-08-25 NOTE — ED NOTES
Chief Complaint:    Mental Health Problem      Provisional Diagnosis:   Probable depression  Memory Loss  Marijuana use      Risk, Psychosocial and Contextual Factors: (homeless, lack of social support etc.): Patient is not med compliant      Current MH Treatment: No current or past hx of treatment or psych hospital stays        Present Suicidal Behavior: DENIES    Verbal:    Attempt:      Access to Weapons:Household items only      C-SSRS Current Suicide Risk: Low, Moderate or High:          Past Suicidal Behavior:DENIES    Verbal:    Attempts:      Self-Injurious/Self-Mutilation: Denies      Traumatic Event Within Past 2 Weeks: Nothing specific noted. Spouse notes patient behaviors have been ongoing off and on since last October. Current Abuse:  Denies      Legal: Denies      Violence: Patient  is cooperative      Protective Factors:  , family are supportive      Housing: Lives with spouse      Clinical Summary:  Patient presents to ED with spouse after being pink slipped by [facility] for odd behaviors, sleeping for days, compulsive behaviors such as showering every 15 minutes on some days throughout the day. Patient is not med compliant with her diabetes and blood pressure meds. Patient is unable to care for self and has been incontinent of bowel and bladder at times. Patient has not eaten since Sunday per spouse. Her memory is poor and spouse states he had to take her keys to car away due to erratic driving. Patient was cooperative with assessment but not reliable historian.  used for collateral information. Patient denies SI/HI/AVH. Patient sleeping excessively. Spouse states she will sleep for days. Appetite is poor . Spouse states patient has lost 50-60 pounds and has not eaten since this past Sunday. Patient has no MH history, no out patient or inpatient [address] [place] [address] services. Patient smokes marijuana daily and frequently. ( Per spouse, about 3 oz.  a week.) No other illicit drug use noted or alcohol use. Spouse states patient goes through periods like this every month and behavior can be erratic. He has noted increase in odd behaviors since October of last year. Patient kept making a squinting face during conversation.  states this is new behavior recently and happens often. Patient did this throughout assessment. Patient would benefit from further evaluation and treatment at a psychiatric hospital.      Level of Care Disposition:      Consulted with medical provider. Patient is medically stabilized. Consulted with patients RN about abnormalities or medical concerns. No abnormalities or medical concerns noted. Consulted with_Andrew Maria PA-C__Patient to be referred for further evaluation and treatment once medically cleared.            MEAGHAN Patel Arm  08/25/23 1609

## 2023-08-25 NOTE — ED NOTES
Patient pink slipped by [address] Will refer to [address] once medical clearance is received and all labs resulted.      MEAGHAN Perez  08/25/23 1064

## 2023-08-25 NOTE — ED PROVIDER NOTES
Emergency Department Encounter    Patient: Edwina Julio  MRN: [id number]  : 1975  Date of Evaluation: 2023  ED Provider:  Alex Monson PA-C    Triage Chief Complaint:   Mental Health Problem ([place] slipped by [facility])    [place]:  Edwina Julio is a 53 y.o. female that presents to the emergency department from a outside practice with a concern for behavioral changes have been going on since last October by her . Patient reports patient smokes a large amount of marijuana a day and has been doing some monthly spending approximately $3000 of marijuana each month. Patient has had episodes of urinating and defecating on herself and also sleeping in her urine and feces. Patient has also been not acting herself taking showers often, forgetting things often as well as being nonresponsive at times to him but still maintaining her own where breathing appropriately. Patient denies any headache, reports that she feels fine, no painful urination, no back pain no abdominal pain no chest pain or shortness of breath. Denies any paresthesias in upper or lower extremities. ROS - see HPI, below listed is current ROS at time of my eval:  10 systems reviewed and negative except as above. Past Medical History:   Diagnosis Date    Depression     Essential hypertension     Hyperlipidemia     Major depressive disorder, single episode, unspecified     Obesity     Obesity, unspecified     Type 2 diabetes mellitus ([address])      History reviewed. No pertinent surgical history.   Family History   Problem Relation Age of Onset    Coronary Art Dis Mother     Heart Attack Mother     Heart Disease Mother     High Blood Pressure Mother     High Cholesterol Mother     Atrial Fibrillation Mother     Heart Surgery Mother     High Blood Pressure Father     Deep Vein Thrombosis Father     Diabetes Father     Alcohol Abuse Father     Leukemia Father     Heart Disease Father     Heart Failure Maternal Grandmother is talking to her and her  regarding the CT findings, however I do not know how much she is actually comprehending of what I am telling her. Made has been aware that she should be admitted for further management here at this hospital unless transfer is recommended by primary team.  Has not had vomiting expect for 1 episode she had approximately 2 hours ago. Given finding of large frontal lobe mass, Dr. Kenneth Duff was consulted who evaluated patient on a iPad and cleared patient's pink slip. History from : Patient    Limitations to history : None    Patient was given the following medications:  Medications   sodium chloride 0.9 % bolus 1,000 mL (1,000 mLs IntraVENous New Bag 8/25/23 1843)   levETIRAcetam (KEPPRA) 1,000 mg in sodium chloride 0.9 % 100 mL IVPB (has no administration in time range)   dexamethasone (PF) (DECADRON) injection 10 mg (10 mg IntraVENous Given 8/25/23 1843)       Independent Imaging Interpretation by me: Ct Scan of head large left frontal lobe mass with midline shift and edema. Discussion with Other Profesionals : Spoke with neurosurgeon on-call Dr. Holland Castro first name Esther Farris discussed case and he confirmed the patient can be managed here. Social Determinants : Patient intoxicated smells of marijuana was made initial evaluation difficult. Records Reviewed : None    Is this patient to be included in the SEP-1 Core Measure due to severe sepsis or septic shock? No Exclusion criteria - the patient is NOT to be included for SEP-1 Core Measure due to: Infection is not suspected    Total critical care time today provided was 20 minutes. This excludes seperately billable procedure. Critical care time provided for 20 that required close evaluation and/or intervention with concern for patient decompensation.     Disposition Considerations (tests considered but not done, Shared Decision Making, Pt Expectation of Test or Tx.):   Patient will be admitted for plan regarding her large brain

## 2023-08-26 VITALS
TEMPERATURE: 98.2 F | HEART RATE: 88 BPM | WEIGHT: 208.56 LBS | BODY MASS INDEX: 34.71 KG/M2 | DIASTOLIC BLOOD PRESSURE: 76 MMHG | SYSTOLIC BLOOD PRESSURE: 169 MMHG | OXYGEN SATURATION: 98 % | RESPIRATION RATE: 16 BRPM

## 2023-08-26 LAB
ANION GAP SERPL CALCULATED.3IONS-SCNC: 13 MMOL/L (ref 4–16)
BASOPHILS ABSOLUTE: 0 K/CU MM
BASOPHILS RELATIVE PERCENT: 0.1 % (ref 0–1)
BUN SERPL-MCNC: 9 MG/DL (ref 6–23)
CALCIUM IONIZED: 4.28 MG/DL (ref 4.48–5.28)
CALCIUM SERPL-MCNC: 8.5 MG/DL (ref 8.3–10.6)
CHLORIDE BLD-SCNC: 101 MMOL/L (ref 99–110)
CO2: 21 MMOL/L (ref 21–32)
CREAT SERPL-MCNC: 0.6 MG/DL (ref 0.6–1.1)
DIFFERENTIAL TYPE: ABNORMAL
EKG ATRIAL RATE: 65 BPM
EKG DIAGNOSIS: NORMAL
EKG P AXIS: 24 DEGREES
EKG P-R INTERVAL: 124 MS
EKG Q-T INTERVAL: 422 MS
EKG QRS DURATION: 88 MS
EKG QTC CALCULATION (BAZETT): 438 MS
EKG R AXIS: 36 DEGREES
EKG T AXIS: 17 DEGREES
EKG VENTRICULAR RATE: 65 BPM
EOSINOPHILS ABSOLUTE: 0 K/CU MM
EOSINOPHILS RELATIVE PERCENT: 0 % (ref 0–3)
ESTIMATED AVERAGE GLUCOSE: 186 MG/DL
GFR SERPL CREATININE-BSD FRML MDRD: >60 ML/MIN/1.73M2
GLUCOSE BLD-MCNC: 205 MG/DL (ref 70–99)
GLUCOSE SERPL-MCNC: 244 MG/DL (ref 70–99)
HBA1C MFR BLD: 8.1 % (ref 4.2–6.3)
HCT VFR BLD CALC: 41.6 % (ref 37–47)
HEMOGLOBIN: 13.7 GM/DL (ref 12.5–16)
IMMATURE NEUTROPHIL %: 0.7 % (ref 0–0.43)
IONIZED CA: 1.07 MMOL/L (ref 1.12–1.32)
LYMPHOCYTES ABSOLUTE: 1.1 K/CU MM
LYMPHOCYTES RELATIVE PERCENT: 15.3 % (ref 24–44)
MAGNESIUM: 1.9 MG/DL (ref 1.8–2.4)
MCH RBC QN AUTO: 25.8 PG (ref 27–31)
MCHC RBC AUTO-ENTMCNC: 32.9 % (ref 32–36)
MCV RBC AUTO: 78.3 FL (ref 78–100)
MONOCYTES ABSOLUTE: 0.3 K/CU MM
MONOCYTES RELATIVE PERCENT: 4 % (ref 0–4)
NUCLEATED RBC %: 0 %
PDW BLD-RTO: 16.7 % (ref 11.7–14.9)
PHOSPHORUS: 3.6 MG/DL (ref 2.5–4.9)
PLATELET # BLD: 353 K/CU MM (ref 140–440)
PMV BLD AUTO: 10.7 FL (ref 7.5–11.1)
POTASSIUM SERPL-SCNC: 4 MMOL/L (ref 3.5–5.1)
RBC # BLD: 5.31 M/CU MM (ref 4.2–5.4)
SEGMENTED NEUTROPHILS ABSOLUTE COUNT: 5.8 K/CU MM
SEGMENTED NEUTROPHILS RELATIVE PERCENT: 79.9 % (ref 36–66)
SODIUM BLD-SCNC: 135 MMOL/L (ref 135–145)
TOTAL IMMATURE NEUTOROPHIL: 0.05 K/CU MM
TOTAL NUCLEATED RBC: 0 K/CU MM
WBC # BLD: 7.2 K/CU MM (ref 4–10.5)

## 2023-08-26 PROCEDURE — 82962 GLUCOSE BLOOD TEST: CPT

## 2023-08-26 PROCEDURE — 84100 ASSAY OF PHOSPHORUS: CPT

## 2023-08-26 PROCEDURE — 83735 ASSAY OF MAGNESIUM: CPT

## 2023-08-26 PROCEDURE — 80048 BASIC METABOLIC PNL TOTAL CA: CPT

## 2023-08-26 PROCEDURE — 6360000002 HC RX W HCPCS: Performed by: STUDENT IN AN ORGANIZED HEALTH CARE EDUCATION/TRAINING PROGRAM

## 2023-08-26 PROCEDURE — 94761 N-INVAS EAR/PLS OXIMETRY MLT: CPT

## 2023-08-26 PROCEDURE — 6370000000 HC RX 637 (ALT 250 FOR IP): Performed by: STUDENT IN AN ORGANIZED HEALTH CARE EDUCATION/TRAINING PROGRAM

## 2023-08-26 PROCEDURE — 2580000003 HC RX 258: Performed by: PHYSICIAN ASSISTANT

## 2023-08-26 PROCEDURE — 82330 ASSAY OF CALCIUM: CPT

## 2023-08-26 PROCEDURE — 2580000003 HC RX 258: Performed by: STUDENT IN AN ORGANIZED HEALTH CARE EDUCATION/TRAINING PROGRAM

## 2023-08-26 PROCEDURE — 85025 COMPLETE CBC W/AUTO DIFF WBC: CPT

## 2023-08-26 PROCEDURE — 6360000002 HC RX W HCPCS: Performed by: PHYSICIAN ASSISTANT

## 2023-08-26 PROCEDURE — 2500000003 HC RX 250 WO HCPCS: Performed by: STUDENT IN AN ORGANIZED HEALTH CARE EDUCATION/TRAINING PROGRAM

## 2023-08-26 PROCEDURE — 6360000002 HC RX W HCPCS: Performed by: NURSE PRACTITIONER

## 2023-08-26 PROCEDURE — 83036 HEMOGLOBIN GLYCOSYLATED A1C: CPT

## 2023-08-26 RX ORDER — INSULIN LISPRO 100 [IU]/ML
0-8 INJECTION, SOLUTION INTRAVENOUS; SUBCUTANEOUS EVERY 6 HOURS
Status: DISCONTINUED | OUTPATIENT
Start: 2023-08-26 | End: 2023-08-26 | Stop reason: HOSPADM

## 2023-08-26 RX ORDER — CALCIUM GLUCONATE 20 MG/ML
2000 INJECTION, SOLUTION INTRAVENOUS ONCE
Status: COMPLETED | OUTPATIENT
Start: 2023-08-26 | End: 2023-08-26

## 2023-08-26 RX ADMIN — DEXAMETHASONE SODIUM PHOSPHATE 4 MG: 4 INJECTION, SOLUTION INTRAMUSCULAR; INTRAVENOUS at 10:14

## 2023-08-26 RX ADMIN — LABETALOL HYDROCHLORIDE 10 MG: 5 INJECTION, SOLUTION INTRAVENOUS at 00:04

## 2023-08-26 RX ADMIN — HYDRALAZINE HYDROCHLORIDE 10 MG: 20 INJECTION INTRAMUSCULAR; INTRAVENOUS at 11:30

## 2023-08-26 RX ADMIN — SODIUM CHLORIDE, PRESERVATIVE FREE 10 ML: 5 INJECTION INTRAVENOUS at 09:52

## 2023-08-26 RX ADMIN — SODIUM CHLORIDE 5 MG/HR: 9 INJECTION, SOLUTION INTRAVENOUS at 00:38

## 2023-08-26 RX ADMIN — FOLIC ACID 1 MG: 1 TABLET ORAL at 09:51

## 2023-08-26 RX ADMIN — LEVETIRACETAM: 100 INJECTION, SOLUTION INTRAVENOUS at 10:25

## 2023-08-26 RX ADMIN — LOSARTAN POTASSIUM 50 MG: 25 TABLET, FILM COATED ORAL at 09:51

## 2023-08-26 RX ADMIN — CALCIUM GLUCONATE 2000 MG: 20 INJECTION, SOLUTION INTRAVENOUS at 11:11

## 2023-08-26 RX ADMIN — THIAMINE HYDROCHLORIDE 250 MG: 100 INJECTION, SOLUTION INTRAMUSCULAR; INTRAVENOUS at 09:54

## 2023-08-26 RX ADMIN — SODIUM CHLORIDE 1 G: 1 TABLET ORAL at 09:51

## 2023-08-26 RX ADMIN — MULTIPLE VITAMINS W/ MINERALS TAB 1 TABLET: TAB at 09:51

## 2023-08-26 NOTE — PROGRESS NOTES
Hospitalist Progress Note      Name:  Ron Powell /Age/Sex: 1975  (53 y.o. female)   MRN & CSN:  [id number] & [id number] Admission Date/Time: 2023  1:43 PM   Location:  -A PCP: Faustino Varghese [address] Day: 2    Assessment and Plan:   Ron Powell is a 53 y.o.  female  who presents with Brain tumor [facility])    Brain mass  -Neurosurgery following and plan to transfer to [facility] critical care today for surgery likely tomorrow  -Care for ICU team and neurosurgery    History of type 2 diabetes: Continue sliding scale insulin  History of hypertension: Currently on losartan we will monitor    Diet ADULT DIET; Regular   DVT Prophylaxis [] Lovenox, []  Heparin, [] SCDs, [] Ambulation   GI Prophylaxis [] PPI,  [] H2 Blocker,  [] Carafate,  [] Diet/Tube Feeds   Code Status Full Code   Disposition Patient requires continued admission due to    MDM [] Low, [] Moderate,[]  High  Patient's risk as above due to      History of Present Illness:     Patient was sleeping resting comfortably this morning in no acute distress on room air; would not awaken for my exam    Objective: Intake/Output Summary (Last 24 hours) at 2023 1109  Last data filed at 2023 0845  Gross per 24 hour   Intake 1176.33 ml   Output 1400 ml   Net -223.67 ml      Vitals:   Vitals:    23 1100   BP: (!) 170/78   Pulse: 88   Resp:    Temp:    SpO2:      Physical Exam:   GEN sleeping resting comfortably this morning  NECK Supple, no apparent thyromegaly or masses. RESP Symmetric chest movement while on room air.   CARDIO/VASC regular rate  GI Abdomen is nondistended   no Vega present    Medications:   Medications:    calcium gluconate  2,000 mg IntraVENous Once    insulin lispro  0-8 Units SubCUTAneous Q6H    IVPB builder   IntraVENous Q12H    sodium chloride flush  5-40 mL IntraVENous 2 times per day    enoxaparin  40 mg SubCUTAneous Nightly    sodium chloride  1 g Oral TID WC    dexamethasone  4 mg IntraVENous Q6H    losartan  50 mg Oral Daily    atorvastatin  40 mg Oral Nightly    folic acid  1 mg Oral Daily    multivitamin  1 tablet Oral Daily    [Held by provider] NIFEdipine  60 mg Oral Nightly    thiamine (VITAMIN B1) IVPB  250 mg IntraVENous BID    [START ON 8/28/2023] thiamine  100 mg Oral Daily      Infusions:    niCARdipine Stopped (08/26/23 0523)    sodium chloride      sodium chloride 125 mL/hr at 08/26/23 0708    dextrose       PRN Meds: sodium chloride flush, 5-40 mL, PRN  sodium chloride, 500 mL, PRN  ondansetron, 4 mg, Q8H PRN   Or  ondansetron, 4 mg, Q6H PRN  polyethylene glycol, 17 g, Daily PRN  acetaminophen, 650 mg, Q6H PRN   Or  acetaminophen, 650 mg, Q6H PRN  hydrALAZINE, 10 mg, Q1H PRN  labetalol, 10 mg, Q1H PRN  glucose, 4 tablet, PRN  dextrose bolus, 125 mL, PRN   Or  dextrose bolus, 250 mL, PRN  glucagon (rDNA), 1 mg, PRN  dextrose, , Continuous PRN          Electronically signed by Mariam Whitaker MD on 8/26/2023 at 11:09 AM

## 2023-08-26 NOTE — PROGRESS NOTES
Patient arrived from ED via bed at this time. See flowsheet for vitals. Patient behavior is erratic, with frequent shifts between being very lethargic to very hyperactive (rolling in bed, trying to exit bed). 1 - Dr. Hazel Horowitz at bedside with patient  and cousin to discuss plan of care.

## 2023-08-26 NOTE — PROGRESS NOTES
Dr. Josseline Cao and Crispin Lua NP at bedside for AM rounds. Plan of care discussed with treatment team. Per MD, obtain ordered MRI and s/w Dr. Albania Meyer regarding plan.

## 2023-08-26 NOTE — PROGRESS NOTES
RN s/w Dr. Eileen Thakkar via telephone regarding patient care. Per MD, will setup transfer to [facility] for further evaluation. Dr. Makayla Booker notified of plan.

## 2023-08-26 NOTE — CONSULTS
Consult completed. Two Nexiva 20g 1.75 inch catheters inserted via ultrasound in patient's LFA & ANA M. Brisk blood return noted and catheters flush with ease. Patient tolerated well. Consult IV/PICC team for any questions or if patient's needs change.

## 2023-08-26 NOTE — H&P
History and Physical         NAME: Eva Rodriguez  : 1975  MRN: [id number]      Assessment/Plan:  Eva Rodriguez is a 53 y.o. female with a history of HTN, HLD, MDD, DM type 2, depression,  who presented to [facility] 2023 with Brain tumor, admitted to ICU for q1hr neurchecks and further management    Problem list  Brain tumor  HTN  HLD  DM    Neuro: Presented for psychiatric concerns noted to have new findings of frontal brain tumor with vasogenic edema. Started on decadron 10 mg and 4 q6, Salt tabs for Na goal > 145, Keppra for seizure prophylaxis. Discussed case with Neurosurgery, f/u MRI brain w and w/o stealth protocol. Q1hr neurochecks for first 24 hours and de-escalate thereafter. He will evaluate the patient tomorrow. Cardio:  HDS. Monitor vitals and adjust meds as needed. Restart home antihypertensives  Resp: No acute issues. Atelectasis prevention. GI: ADAT. Gi ppx  : Cr 1.0, monitor uop. Monitor electrolytes and replenish as needed. Heme:  Hgb 14.1, no evidence of bleeding. Noted to have new brain tumor on imaging. Pending intervention. Once resected will required oncology follow-up for treatment planning. DVT ppx: lovenox  ID: WBC 11.6, no evidence of infectious process at this time. Monitor clinically  Endo: known diabetic. POC BG ISS PRN for BG goal 140-180 mg/dl  MSK:  DTI prevention    Tubes/Lines/Drains:  PIV    Code Status: Full       Chief Complaint:    Meal health problem    History of Present Illness:    48yoF who presented with a concern for behavioral changes that has been ongoing since october as per her . He reported that she had been smoking a significant amount of marijuana and having eradic behaviors with eradic spending. She has also was ntoed to have poor overall hygiene and porr self care. Patient was being evalauted for medical clearance for behaviour health admission when she was found to have a large frontal mass on CT imaging.      Vitals, PROVIDED HISTORY: altered mental status. TECHNOLOGIST PROVIDED HISTORY: Reason for exam:->altered mental status. Has a \"code stroke\" or \"stroke alert\" been called? ->No Decision Support Exception - unselect if not a suspected or confirmed emergency medical condition->Emergency Medical Condition (MA) Reason for Exam: mental health problems, pt got pink slipped by  today, brought in by  and  due to weird behavior at home FINDINGS: BRAIN/VENTRICLES: Mass centered over the left inferior frontal lobe measuring 4.3 x 4.4 x 3.3 cm with central calcification and extensive adjacent vasogenic edema. Associated left right midline shift measuring up to 0.9 cm. Possible effacement of the basilar cisterns. Mass effect on the left lateral ventricle and left frontal horn. The gray-white differentiation is maintained without evidence of an acute infarct. There is no evidence of hydrocephalus. No white matter lesions. ORBITS: The visualized portion of the orbits demonstrate no acute abnormality. SINUSES:  The visualized paranasal sinuses and mastoid air cells demonstrate no acute abnormality. SOFT TISSUES/SKULL: No acute abnormality of the visualized skull or soft tissues. Hyperostosis associated with the left inferior frontal mass. Additional calcification along the inner table overlying the right parietal bone. Large left inferior frontal mass with extensive vasogenic edema, mass effect, and 0.9 cm of left to right midline shift. Central calcification and associated hyperostosis overlying the inferior left frontal bone/orbital roof could represent a large meningioma with underlying malignancy also in the differential.  Possible effacement of the basilar cisterns concerning for herniation. Additional calcification along the inner table overlying the right parietal bone could represent a calcified meningioma. Critical results were called by Dr. Raven Cortez to Thuy Cole on 8/25/2023 at 18:06. Piper Bond

## 2023-08-26 NOTE — CONSULTS
Consult received for left frontal mass. CT reviewed. Large left frontal mass with vasogenic edema and subfalcine herniation. Basal cisterns patent. Suspect meningioma. DDx also includes oligodendroglioma, other. I have contacted the transfer center at [facility]. They have capacity and will initiate transfer asap. Will admit to critical care there and I will consult. Also spoke with the OR at [facility] and case can be done tomorrow at 7:30 am.    OK to hold off with MRI at [facility]. Will get this done at [facility] after transfer. Cont Decadron and Keppra.

## 2023-08-28 LAB
EKG ATRIAL RATE: 65 BPM
EKG DIAGNOSIS: NORMAL
EKG P AXIS: 24 DEGREES
EKG P-R INTERVAL: 124 MS
EKG Q-T INTERVAL: 422 MS
EKG QRS DURATION: 88 MS
EKG QTC CALCULATION (BAZETT): 438 MS
EKG R AXIS: 36 DEGREES
EKG T AXIS: 17 DEGREES
EKG VENTRICULAR RATE: 65 BPM

## 2023-08-28 PROCEDURE — 93010 ELECTROCARDIOGRAM REPORT: CPT | Performed by: INTERNAL MEDICINE

## 2023-11-01 DIAGNOSIS — E78.5 HYPERLIPIDEMIA, UNSPECIFIED HYPERLIPIDEMIA TYPE: ICD-10-CM

## 2023-11-01 DIAGNOSIS — I10 ESSENTIAL HYPERTENSION: ICD-10-CM

## 2023-11-01 DIAGNOSIS — E11.9 TYPE 2 DIABETES MELLITUS WITHOUT COMPLICATION, WITHOUT LONG-TERM CURRENT USE OF INSULIN (HCC): ICD-10-CM

## 2023-11-01 RX ORDER — ATORVASTATIN CALCIUM 20 MG/1
TABLET, FILM COATED ORAL
Qty: 90 TABLET | Refills: 0 | OUTPATIENT
Start: 2023-11-01

## 2023-11-01 RX ORDER — METFORMIN HYDROCHLORIDE 500 MG/1
TABLET, EXTENDED RELEASE ORAL
Qty: 90 TABLET | Refills: 0 | OUTPATIENT
Start: 2023-11-01

## 2023-11-01 RX ORDER — LOSARTAN POTASSIUM 50 MG/1
TABLET ORAL
Qty: 90 TABLET | Refills: 0 | OUTPATIENT
Start: 2023-11-01

## 2023-11-26 DIAGNOSIS — E11.9 TYPE 2 DIABETES MELLITUS WITHOUT COMPLICATION, WITHOUT LONG-TERM CURRENT USE OF INSULIN (HCC): ICD-10-CM

## 2023-11-26 DIAGNOSIS — E78.5 HYPERLIPIDEMIA, UNSPECIFIED HYPERLIPIDEMIA TYPE: ICD-10-CM

## 2023-11-28 RX ORDER — ATORVASTATIN CALCIUM 20 MG/1
TABLET, FILM COATED ORAL
Qty: 90 TABLET | Refills: 0 | OUTPATIENT
Start: 2023-11-28

## 2023-11-28 RX ORDER — METFORMIN HYDROCHLORIDE 500 MG/1
TABLET, EXTENDED RELEASE ORAL
Qty: 90 TABLET | Refills: 0 | OUTPATIENT
Start: 2023-11-28

## 2023-12-29 ENCOUNTER — TELEPHONE (OUTPATIENT)
Dept: INTERNAL MEDICINE CLINIC | Age: 48
End: 2023-12-29

## 2023-12-29 PROBLEM — Z98.890 HISTORY OF CRANIOTOMY: Status: ACTIVE | Noted: 2023-12-29

## 2023-12-29 PROBLEM — D32.9 MENINGIOMA (HCC): Status: ACTIVE | Noted: 2023-12-29

## 2023-12-29 RX ORDER — LABETALOL 200 MG/1
200 TABLET, FILM COATED ORAL 2 TIMES DAILY
Qty: 60 TABLET | Refills: 0 | Status: SHIPPED | OUTPATIENT
Start: 2023-12-29

## 2023-12-29 RX ORDER — MAGNESIUM OXIDE 400 MG/1
400 TABLET ORAL DAILY
Qty: 30 TABLET | Refills: 0 | Status: SHIPPED | OUTPATIENT
Start: 2023-12-29

## 2023-12-29 RX ORDER — FLUOXETINE HYDROCHLORIDE 20 MG/1
20 CAPSULE ORAL DAILY
Qty: 30 CAPSULE | Refills: 0 | Status: SHIPPED | OUTPATIENT
Start: 2023-12-29

## 2023-12-29 RX ORDER — AMLODIPINE BESYLATE 10 MG/1
10 TABLET ORAL DAILY
Qty: 30 TABLET | Refills: 0 | Status: SHIPPED | OUTPATIENT
Start: 2023-12-29

## 2023-12-29 RX ORDER — LEVETIRACETAM 1000 MG/1
1000 TABLET ORAL 2 TIMES DAILY
Qty: 60 TABLET | Refills: 0 | Status: SHIPPED | OUTPATIENT
Start: 2023-12-29

## 2023-12-29 RX ORDER — LOSARTAN POTASSIUM 100 MG/1
100 TABLET ORAL DAILY
Qty: 30 TABLET | Refills: 0 | Status: SHIPPED | OUTPATIENT
Start: 2023-12-29

## 2023-12-29 RX ORDER — PANTOPRAZOLE SODIUM 40 MG/1
40 TABLET, DELAYED RELEASE ORAL
Qty: 30 TABLET | Refills: 0 | Status: SHIPPED | OUTPATIENT
Start: 2023-12-29

## 2023-12-30 NOTE — TELEPHONE ENCOUNTER
Pull discharge notes and med list from [facility]. Patient has appt 1/3    Spoke to . Patient discharged from [facility] before [facility]. She was given packets of medications and he realized they only had one day left. The medications have been changed. He was able to list the medications for refill. Meds sent in. She will have appointment on 1/3

## 2024-01-03 ENCOUNTER — OFFICE VISIT (OUTPATIENT)
Dept: INTERNAL MEDICINE CLINIC | Age: 49
End: 2024-01-03
Payer: COMMERCIAL

## 2024-01-03 VITALS
WEIGHT: 202.4 LBS | HEIGHT: 65 IN | HEART RATE: 90 BPM | SYSTOLIC BLOOD PRESSURE: 120 MMHG | DIASTOLIC BLOOD PRESSURE: 78 MMHG | BODY MASS INDEX: 33.72 KG/M2 | OXYGEN SATURATION: 99 %

## 2024-01-03 DIAGNOSIS — E11.65 TYPE 2 DIABETES MELLITUS WITH HYPERGLYCEMIA, WITHOUT LONG-TERM CURRENT USE OF INSULIN (HCC): Primary | ICD-10-CM

## 2024-01-03 DIAGNOSIS — D32.9 MENINGIOMA (HCC): ICD-10-CM

## 2024-01-03 DIAGNOSIS — T81.40XS POSTOPERATIVE INFECTION, UNSPECIFIED TYPE, SEQUELA: ICD-10-CM

## 2024-01-03 DIAGNOSIS — I10 ESSENTIAL HYPERTENSION: ICD-10-CM

## 2024-01-03 DIAGNOSIS — G40.89 OTHER SEIZURES (HCC): ICD-10-CM

## 2024-01-03 DIAGNOSIS — Z98.890 S/P CRANIOTOMY: ICD-10-CM

## 2024-01-03 DIAGNOSIS — E78.5 HYPERLIPIDEMIA, UNSPECIFIED HYPERLIPIDEMIA TYPE: ICD-10-CM

## 2024-01-03 DIAGNOSIS — F33.1 MAJOR DEPRESSIVE DISORDER, RECURRENT, MODERATE (HCC): ICD-10-CM

## 2024-01-03 PROBLEM — R56.9 UNSPECIFIED CONVULSIONS (HCC): Status: ACTIVE | Noted: 2024-01-03

## 2024-01-03 PROCEDURE — 3074F SYST BP LT 130 MM HG: CPT | Performed by: FAMILY MEDICINE

## 2024-01-03 PROCEDURE — 3078F DIAST BP <80 MM HG: CPT | Performed by: FAMILY MEDICINE

## 2024-01-03 PROCEDURE — 99214 OFFICE O/P EST MOD 30 MIN: CPT | Performed by: FAMILY MEDICINE

## 2024-01-03 RX ORDER — ATORVASTATIN CALCIUM 20 MG/1
20 TABLET, FILM COATED ORAL DAILY
COMMUNITY

## 2024-01-03 RX ORDER — CEFADROXIL 500 MG/1
500 CAPSULE ORAL 2 TIMES DAILY
Qty: 50 CAPSULE | Refills: 0 | Status: SHIPPED | OUTPATIENT
Start: 2024-01-03 | End: 2024-01-28

## 2024-01-03 ASSESSMENT — ENCOUNTER SYMPTOMS
BLOOD IN STOOL: 0
CONSTIPATION: 0
ABDOMINAL PAIN: 0
SHORTNESS OF BREATH: 0
BACK PAIN: 0
NAUSEA: 0
COUGH: 0
DIARRHEA: 0

## 2024-01-03 ASSESSMENT — PATIENT HEALTH QUESTIONNAIRE - PHQ9
SUM OF ALL RESPONSES TO PHQ QUESTIONS 1-9: 1
SUM OF ALL RESPONSES TO PHQ QUESTIONS 1-9: 1
7. TROUBLE CONCENTRATING ON THINGS, SUCH AS READING THE NEWSPAPER OR WATCHING TELEVISION: 0
8. MOVING OR SPEAKING SO SLOWLY THAT OTHER PEOPLE COULD HAVE NOTICED. OR THE OPPOSITE, BEING SO FIGETY OR RESTLESS THAT YOU HAVE BEEN MOVING AROUND A LOT MORE THAN USUAL: 0
6. FEELING BAD ABOUT YOURSELF - OR THAT YOU ARE A FAILURE OR HAVE LET YOURSELF OR YOUR FAMILY DOWN: 0
2. FEELING DOWN, DEPRESSED OR HOPELESS: 0
9. THOUGHTS THAT YOU WOULD BE BETTER OFF DEAD, OR OF HURTING YOURSELF: 0
10. IF YOU CHECKED OFF ANY PROBLEMS, HOW DIFFICULT HAVE THESE PROBLEMS MADE IT FOR YOU TO DO YOUR WORK, TAKE CARE OF THINGS AT HOME, OR GET ALONG WITH OTHER PEOPLE: 0
SUM OF ALL RESPONSES TO PHQ9 QUESTIONS 1 & 2: 0
5. POOR APPETITE OR OVEREATING: 0
SUM OF ALL RESPONSES TO PHQ QUESTIONS 1-9: 1
1. LITTLE INTEREST OR PLEASURE IN DOING THINGS: 0
3. TROUBLE FALLING OR STAYING ASLEEP: 0
SUM OF ALL RESPONSES TO PHQ QUESTIONS 1-9: 1
4. FEELING TIRED OR HAVING LITTLE ENERGY: 1

## 2024-01-03 NOTE — PROGRESS NOTES
her speech as well as her balance.  She has decreased ability to walk long distances.  She would like to do outpatient speech therapy and physical therapy with Odalys Zuniga.  Diabetes mellitus type 2-patient was discharged on insulin sliding scale as well as basal insulin.  She does not really want to continue with insulin.  Metformin was stopped in the hospital.  She may want to restart  Hyperlipidemia-on Lipitor 20 mg  Depression-on fluoxetine 20 mg  Hypertension-on amlodipine, labetalol, and losartan.  GERD-on Protonix 40 mg    Review of Systems   Constitutional:  Negative for diaphoresis and fever.   HENT: Negative.     Eyes:  Negative for visual disturbance.   Respiratory:  Negative for cough and shortness of breath.    Cardiovascular:  Negative for chest pain and palpitations.   Gastrointestinal:  Negative for abdominal pain, blood in stool, constipation, diarrhea and nausea.   Genitourinary:  Negative for difficulty urinating and dysuria.   Musculoskeletal:  Negative for back pain.   Neurological:  Negative for dizziness, light-headedness and headaches.   Psychiatric/Behavioral:  Negative for dysphoric mood.        No Known Allergies  Current Outpatient Medications   Medication Sig Dispense Refill    atorvastatin (LIPITOR) 20 MG tablet Take 1 tablet by mouth daily      cefadroxil (DURICEF) 500 MG capsule Take 1 capsule by mouth 2 times daily for 25 days 50 capsule 0    levETIRAcetam (KEPPRA) 1000 MG tablet Take 1 tablet by mouth 2 times daily 60 tablet 0    pantoprazole (PROTONIX) 40 MG tablet Take 1 tablet by mouth every morning (before breakfast) 30 tablet 0    amLODIPine (NORVASC) 10 MG tablet Take 1 tablet by mouth daily 30 tablet 0    labetalol (NORMODYNE) 200 MG tablet Take 1 tablet by mouth 2 times daily 60 tablet 0    FLUoxetine (PROZAC) 20 MG capsule Take 1 capsule by mouth daily 30 capsule 0    losartan (COZAAR) 100 MG tablet Take 1 tablet by mouth daily 30 tablet 0    magnesium oxide (MAG-OX) 400 MG

## 2024-01-05 ENCOUNTER — TELEPHONE (OUTPATIENT)
Dept: INTERNAL MEDICINE CLINIC | Age: 49
End: 2024-01-05

## 2024-01-05 DIAGNOSIS — R26.89 BALANCE PROBLEM: ICD-10-CM

## 2024-01-05 DIAGNOSIS — R47.9 SPEECH DISTURBANCE, UNSPECIFIED TYPE: Primary | ICD-10-CM

## 2024-01-05 PROBLEM — T81.40XA POSTOPERATIVE INFECTION: Status: ACTIVE | Noted: 2024-01-05

## 2024-01-05 LAB
ALBUMIN SERPL-MCNC: 3.7 G/DL
ALP BLD-CCNC: 83 U/L
ALT SERPL-CCNC: 20 U/L
ANION GAP SERPL CALCULATED.3IONS-SCNC: 1.3 MMOL/L
AST SERPL-CCNC: 31 U/L
BASOPHILS ABSOLUTE: 0 /ΜL
BASOPHILS RELATIVE PERCENT: 0.7 %
BILIRUB SERPL-MCNC: 0.2 MG/DL (ref 0.1–1.4)
BUN BLDV-MCNC: 4 MG/DL
C-REACTIVE PROTEIN: <0.3
CALCIUM SERPL-MCNC: 8.7 MG/DL
CHLORIDE BLD-SCNC: 101 MMOL/L
CO2: 24 MMOL/L
CREAT SERPL-MCNC: 0.5 MG/DL
EGFR: 116
EOSINOPHILS ABSOLUTE: 0.1 /ΜL
EOSINOPHILS RELATIVE PERCENT: 1.5 %
ESTIMATED AVERAGE GLUCOSE: NORMAL
GLUCOSE BLD-MCNC: 112 MG/DL
HBA1C MFR BLD: 5.5 %
HCT VFR BLD CALC: 33.7 % (ref 36–46)
HEMOGLOBIN: 10.4 G/DL (ref 12–16)
LYMPHOCYTES ABSOLUTE: 2 /ΜL
LYMPHOCYTES RELATIVE PERCENT: 33.2 %
MCH RBC QN AUTO: 23.3 PG
MCHC RBC AUTO-ENTMCNC: 30.9 G/DL
MCV RBC AUTO: 75.4 FL
MONOCYTES ABSOLUTE: 0.5 /ΜL
MONOCYTES RELATIVE PERCENT: 7.5 %
NEUTROPHILS ABSOLUTE: 3.5 /ΜL
NEUTROPHILS RELATIVE PERCENT: 57.1 %
PDW BLD-RTO: 22.9 %
PLATELET # BLD: ABNORMAL 10*3/UL
PMV BLD AUTO: ABNORMAL FL
POTASSIUM SERPL-SCNC: 3.6 MMOL/L
RBC # BLD: 4.47 10^6/ΜL
SEDIMENTATION RATE, ERYTHROCYTE: 38
SODIUM BLD-SCNC: 136 MMOL/L
TOTAL PROTEIN: 6.5
WBC # BLD: 6.1 10^3/ML

## 2024-01-05 NOTE — TELEPHONE ENCOUNTER
Fax last note ,demographics and referrals for speech and physical therapy  to [phone] (Odalys Zuniga)

## 2024-01-19 ENCOUNTER — TELEPHONE (OUTPATIENT)
Dept: INTERNAL MEDICINE CLINIC | Age: 49
End: 2024-01-19

## 2024-01-19 NOTE — TELEPHONE ENCOUNTER
Medical records request received from [facility] and sent to [facility] for processing.

## 2024-01-25 DIAGNOSIS — T81.40XS POSTOPERATIVE INFECTION, UNSPECIFIED TYPE, SEQUELA: ICD-10-CM

## 2024-01-26 RX ORDER — LOSARTAN POTASSIUM 100 MG/1
100 TABLET ORAL DAILY
Qty: 30 TABLET | Refills: 2 | Status: SHIPPED | OUTPATIENT
Start: 2024-01-26

## 2024-01-26 RX ORDER — MAGNESIUM OXIDE 400 MG/1
400 TABLET ORAL DAILY
Qty: 30 TABLET | Refills: 2 | Status: SHIPPED | OUTPATIENT
Start: 2024-01-26

## 2024-01-26 RX ORDER — PANTOPRAZOLE SODIUM 40 MG/1
40 TABLET, DELAYED RELEASE ORAL
Qty: 30 TABLET | Refills: 2 | Status: SHIPPED | OUTPATIENT
Start: 2024-01-26

## 2024-01-26 RX ORDER — CEFADROXIL 500 MG/1
500 CAPSULE ORAL 2 TIMES DAILY
Qty: 60 CAPSULE | Refills: 2 | Status: SHIPPED | OUTPATIENT
Start: 2024-01-26

## 2024-01-26 RX ORDER — LEVETIRACETAM 1000 MG/1
1000 TABLET ORAL 2 TIMES DAILY
Qty: 60 TABLET | Refills: 2 | Status: SHIPPED | OUTPATIENT
Start: 2024-01-26 | End: 2024-04-25

## 2024-01-26 RX ORDER — ATORVASTATIN CALCIUM 20 MG/1
20 TABLET, FILM COATED ORAL DAILY
Qty: 30 TABLET | Refills: 2 | Status: SHIPPED | OUTPATIENT
Start: 2024-01-26 | End: 2024-04-25

## 2024-01-26 RX ORDER — LABETALOL 200 MG/1
200 TABLET, FILM COATED ORAL 2 TIMES DAILY
Qty: 60 TABLET | Refills: 2 | Status: SHIPPED | OUTPATIENT
Start: 2024-01-26

## 2024-01-26 RX ORDER — AMLODIPINE BESYLATE 10 MG/1
10 TABLET ORAL DAILY
Qty: 30 TABLET | Refills: 2 | Status: SHIPPED | OUTPATIENT
Start: 2024-01-26 | End: 2024-04-25

## 2024-01-26 RX ORDER — FLUOXETINE HYDROCHLORIDE 20 MG/1
20 CAPSULE ORAL DAILY
Qty: 30 CAPSULE | Refills: 2 | Status: SHIPPED | OUTPATIENT
Start: 2024-01-26

## 2024-02-20 ENCOUNTER — TELEPHONE (OUTPATIENT)
Dept: INTERNAL MEDICINE CLINIC | Age: 49
End: 2024-02-20

## 2024-02-20 DIAGNOSIS — E11.65 TYPE 2 DIABETES MELLITUS WITH HYPERGLYCEMIA, WITHOUT LONG-TERM CURRENT USE OF INSULIN (HCC): Primary | ICD-10-CM

## 2024-02-20 RX ORDER — GLUCOSAMINE HCL/CHONDROITIN SU 500-400 MG
CAPSULE ORAL
Qty: 100 STRIP | Refills: 5 | Status: SHIPPED | OUTPATIENT
Start: 2024-02-20

## 2024-02-20 RX ORDER — LANCETS 30 GAUGE
1 EACH MISCELLANEOUS 2 TIMES DAILY
Qty: 100 EACH | Refills: 5 | Status: SHIPPED | OUTPATIENT
Start: 2024-02-20

## 2024-02-21 NOTE — TELEPHONE ENCOUNTER
Called and requested labs from [facility].   Faxed request to Dr.Kunai Oliver last OV note  Called and left a VM to give us a call to let them know we sent in supplies

## 2024-02-22 ENCOUNTER — TELEPHONE (OUTPATIENT)
Dept: INTERNAL MEDICINE CLINIC | Age: 49
End: 2024-02-22

## 2024-02-22 DIAGNOSIS — D64.9 ANEMIA, UNSPECIFIED TYPE: Primary | ICD-10-CM

## 2024-02-23 DIAGNOSIS — I10 ESSENTIAL HYPERTENSION: ICD-10-CM

## 2024-02-23 DIAGNOSIS — E11.65 TYPE 2 DIABETES MELLITUS WITH HYPERGLYCEMIA, WITHOUT LONG-TERM CURRENT USE OF INSULIN (HCC): ICD-10-CM

## 2024-02-23 DIAGNOSIS — T81.40XS POSTOPERATIVE INFECTION, UNSPECIFIED TYPE, SEQUELA: ICD-10-CM

## 2024-03-11 ENCOUNTER — TRANSCRIBE ORDERS (OUTPATIENT)
Dept: ADMINISTRATIVE | Age: 49
End: 2024-03-11

## 2024-03-11 DIAGNOSIS — D32.0 BENIGN NEOPLASM OF CEREBRAL MENINGES (HCC): Primary | ICD-10-CM

## 2024-03-23 ENCOUNTER — HOSPITAL ENCOUNTER (OUTPATIENT)
Dept: MRI IMAGING | Age: 49
Discharge: HOME | End: 2024-03-23
Attending: NEUROLOGICAL SURGERY
Payer: COMMERCIAL

## 2024-03-23 DIAGNOSIS — D32.0 BENIGN NEOPLASM OF CEREBRAL MENINGES (HCC): ICD-10-CM

## 2024-03-23 PROCEDURE — 6360000004 HC RX CONTRAST MEDICATION: Performed by: NEUROLOGICAL SURGERY

## 2024-03-23 PROCEDURE — A9579 GAD-BASE MR CONTRAST NOS,1ML: HCPCS | Performed by: NEUROLOGICAL SURGERY

## 2024-03-23 PROCEDURE — 70553 MRI BRAIN STEM W/O & W/DYE: CPT

## 2024-03-23 RX ADMIN — GADOTERIDOL 20 ML: 279.3 INJECTION, SOLUTION INTRAVENOUS at 15:21

## 2024-03-26 RX ORDER — MAGNESIUM OXIDE 400 MG/1
400 TABLET ORAL DAILY
Qty: 30 TABLET | Refills: 0 | Status: SHIPPED | OUTPATIENT
Start: 2024-03-26

## 2024-04-02 LAB
FERRITIN: 6 NG/ML (ref 12–156)
FOLATE: 12.9 NG/ML
IRON % SATURATION: 4 % (ref 12–57)
IRON: 18 MCG/DL (ref 35–175)
TOTAL IRON BINDING CAPACITY: 456 MCG/DL (ref 200–450)
VITAMIN B-12: 515 PG/ML (ref 200–1100)

## 2024-04-03 ENCOUNTER — OFFICE VISIT (OUTPATIENT)
Dept: INTERNAL MEDICINE CLINIC | Age: 49
End: 2024-04-03
Payer: COMMERCIAL

## 2024-04-03 VITALS
HEIGHT: 65 IN | DIASTOLIC BLOOD PRESSURE: 68 MMHG | HEART RATE: 64 BPM | WEIGHT: 236.6 LBS | OXYGEN SATURATION: 96 % | SYSTOLIC BLOOD PRESSURE: 114 MMHG | BODY MASS INDEX: 39.42 KG/M2

## 2024-04-03 DIAGNOSIS — T81.40XS POSTOPERATIVE INFECTION, UNSPECIFIED TYPE, SEQUELA: ICD-10-CM

## 2024-04-03 DIAGNOSIS — D50.9 IRON DEFICIENCY ANEMIA, UNSPECIFIED IRON DEFICIENCY ANEMIA TYPE: ICD-10-CM

## 2024-04-03 DIAGNOSIS — E83.42 HYPOMAGNESEMIA: ICD-10-CM

## 2024-04-03 DIAGNOSIS — E78.5 HYPERLIPIDEMIA, UNSPECIFIED HYPERLIPIDEMIA TYPE: ICD-10-CM

## 2024-04-03 DIAGNOSIS — K21.9 GASTROESOPHAGEAL REFLUX DISEASE WITHOUT ESOPHAGITIS: ICD-10-CM

## 2024-04-03 DIAGNOSIS — I10 ESSENTIAL HYPERTENSION: ICD-10-CM

## 2024-04-03 DIAGNOSIS — D32.9 MENINGIOMA (HCC): ICD-10-CM

## 2024-04-03 DIAGNOSIS — E11.65 TYPE 2 DIABETES MELLITUS WITH HYPERGLYCEMIA, WITHOUT LONG-TERM CURRENT USE OF INSULIN (HCC): Primary | ICD-10-CM

## 2024-04-03 DIAGNOSIS — F33.1 MAJOR DEPRESSIVE DISORDER, RECURRENT, MODERATE (HCC): ICD-10-CM

## 2024-04-03 PROCEDURE — 99214 OFFICE O/P EST MOD 30 MIN: CPT | Performed by: FAMILY MEDICINE

## 2024-04-03 PROCEDURE — 3078F DIAST BP <80 MM HG: CPT | Performed by: FAMILY MEDICINE

## 2024-04-03 PROCEDURE — 3044F HG A1C LEVEL LT 7.0%: CPT | Performed by: FAMILY MEDICINE

## 2024-04-03 PROCEDURE — 3074F SYST BP LT 130 MM HG: CPT | Performed by: FAMILY MEDICINE

## 2024-04-03 RX ORDER — ATORVASTATIN CALCIUM 20 MG/1
20 TABLET, FILM COATED ORAL DAILY
Qty: 30 TABLET | Refills: 5 | Status: SHIPPED | OUTPATIENT
Start: 2024-04-03 | End: 2024-09-30

## 2024-04-03 RX ORDER — LABETALOL 200 MG/1
200 TABLET, FILM COATED ORAL 2 TIMES DAILY
Qty: 60 TABLET | Refills: 5 | Status: SHIPPED | OUTPATIENT
Start: 2024-04-03

## 2024-04-03 RX ORDER — FLUOXETINE HYDROCHLORIDE 20 MG/1
20 CAPSULE ORAL DAILY
Qty: 30 CAPSULE | Refills: 5 | Status: SHIPPED | OUTPATIENT
Start: 2024-04-03

## 2024-04-03 RX ORDER — LEVETIRACETAM 1000 MG/1
1000 TABLET ORAL 2 TIMES DAILY
Qty: 60 TABLET | Refills: 5 | Status: SHIPPED | OUTPATIENT
Start: 2024-04-03 | End: 2024-09-30

## 2024-04-03 RX ORDER — CEFADROXIL 500 MG/1
500 CAPSULE ORAL 2 TIMES DAILY
Qty: 60 CAPSULE | Refills: 2 | Status: SHIPPED | OUTPATIENT
Start: 2024-04-03

## 2024-04-03 RX ORDER — LOSARTAN POTASSIUM 100 MG/1
100 TABLET ORAL DAILY
Qty: 30 TABLET | Refills: 5 | Status: SHIPPED | OUTPATIENT
Start: 2024-04-03

## 2024-04-03 RX ORDER — FERROUS SULFATE 325(65) MG
325 TABLET ORAL
Qty: 30 TABLET | Refills: 5 | Status: SHIPPED | OUTPATIENT
Start: 2024-04-03

## 2024-04-03 RX ORDER — PANTOPRAZOLE SODIUM 40 MG/1
40 TABLET, DELAYED RELEASE ORAL
Qty: 30 TABLET | Refills: 5 | Status: SHIPPED | OUTPATIENT
Start: 2024-04-03

## 2024-04-03 RX ORDER — MAGNESIUM OXIDE 400 MG/1
400 TABLET ORAL DAILY
Qty: 30 TABLET | Refills: 5 | Status: SHIPPED | OUTPATIENT
Start: 2024-04-03

## 2024-04-03 RX ORDER — AMLODIPINE BESYLATE 10 MG/1
10 TABLET ORAL DAILY
Qty: 30 TABLET | Refills: 5 | Status: SHIPPED | OUTPATIENT
Start: 2024-04-03 | End: 2024-09-30

## 2024-04-03 NOTE — PROGRESS NOTES
Malgorzata Martin (:  1975) is a 49 y.o. female,established patient, here for evaluation of the following chief complaint(s):  Follow-up (Cutting some medication maybe?/), Medication Refill, Diabetes, and Hypertension         ASSESSMENT/PLAN:  1. Type 2 diabetes mellitus with hyperglycemia, without long-term current use of insulin (HCC)  - Hemoglobin A1C; Future  The patient is asked to make an attempt to improve diet and exercise patterns     2. Essential hypertension  - amLODIPine (NORVASC) 10 MG tablet; Take 1 tablet by mouth daily  Dispense: 30 tablet; Refill: 5  - labetalol (NORMODYNE) 200 MG tablet; Take 1 tablet by mouth 2 times daily  Dispense: 60 tablet; Refill: 5  - losartan (COZAAR) 100 MG tablet; Take 1 tablet by mouth daily  Dispense: 30 tablet; Refill: 5    3. Iron deficiency anemia, unspecified iron deficiency anemia type  - ferrous sulfate (IRON 325) 325 (65 Fe) MG tablet; Take 1 tablet by mouth daily (with breakfast)  Dispense: 30 tablet; Refill: 5  - POCT Fecal Immunochemical Test (FIT); Future  - CBC with Auto Differential; Future  - Iron; Future    4. Hyperlipidemia, unspecified hyperlipidemia type  - atorvastatin (LIPITOR) 20 MG tablet; Take 1 tablet by mouth daily  Dispense: 30 tablet; Refill: 5    5. Major depressive disorder, recurrent, moderate (HCC)  - FLUoxetine (PROZAC) 20 MG capsule; Take 1 capsule by mouth daily  Dispense: 30 capsule; Refill: 5    6. Meningioma (HCC)  S/P craniotomy  - levETIRAcetam (KEPPRA) 1000 MG tablet; Take 1 tablet by mouth 2 times daily  Dispense: 60 tablet; Refill: 5  Keep f/u with neurosurgeon    7. Gastroesophageal reflux disease without esophagitis  - pantoprazole (PROTONIX) 40 MG tablet; Take 1 tablet by mouth every morning (before breakfast)  Dispense: 30 tablet; Refill: 5    8. Postoperative infection, unspecified type, sequela  - cefadroxil (DURICEF) 500 MG capsule; Take 1 capsule by mouth 2 times daily  Dispense: 60 capsule; Refill: 2  Keep f/u

## 2024-05-03 DIAGNOSIS — E11.65 TYPE 2 DIABETES MELLITUS WITH HYPERGLYCEMIA, WITHOUT LONG-TERM CURRENT USE OF INSULIN (HCC): ICD-10-CM

## 2024-05-03 RX ORDER — GLUCOSAMINE HCL/CHONDROITIN SU 500-400 MG
CAPSULE ORAL
Qty: 100 STRIP | Refills: 5 | Status: SHIPPED | OUTPATIENT
Start: 2024-05-03

## 2024-05-03 RX ORDER — LANCETS 30 GAUGE
1 EACH MISCELLANEOUS 2 TIMES DAILY
Qty: 100 EACH | Refills: 5 | Status: SHIPPED | OUTPATIENT
Start: 2024-05-03

## 2024-05-06 ENCOUNTER — TELEPHONE (OUTPATIENT)
Dept: INTERNAL MEDICINE CLINIC | Age: 49
End: 2024-05-06

## 2024-06-13 ENCOUNTER — TELEPHONE (OUTPATIENT)
Dept: INTERNAL MEDICINE CLINIC | Age: 49
End: 2024-06-13

## 2024-06-13 NOTE — TELEPHONE ENCOUNTER
Received Medical records from [facility] and faxed to [facility] for [facility].  Any questions on status please refer to [facility] at [phone] option 1.

## 2024-07-13 ENCOUNTER — HOSPITAL ENCOUNTER (OUTPATIENT)
Dept: MRI IMAGING | Age: 49
Discharge: HOME | End: 2024-07-13
Attending: NEUROLOGICAL SURGERY
Payer: COMMERCIAL

## 2024-07-13 DIAGNOSIS — D32.0 BENIGN NEOPLASM OF CEREBRAL MENINGES (HCC): ICD-10-CM

## 2024-07-13 LAB
EGFR, POC: >90 ML/MIN/1.73M2
POC CREATININE: 0.7 MG/DL (ref 0.5–1.2)

## 2024-07-13 PROCEDURE — A9579 GAD-BASE MR CONTRAST NOS,1ML: HCPCS | Performed by: NEUROLOGICAL SURGERY

## 2024-07-13 PROCEDURE — 70553 MRI BRAIN STEM W/O & W/DYE: CPT

## 2024-07-13 PROCEDURE — 6360000004 HC RX CONTRAST MEDICATION: Performed by: NEUROLOGICAL SURGERY

## 2024-07-13 PROCEDURE — 82565 ASSAY OF CREATININE: CPT

## 2024-07-13 RX ADMIN — GADOTERIDOL 20 ML: 279.3 INJECTION, SOLUTION INTRAVENOUS at 11:06

## 2024-08-06 DIAGNOSIS — D50.9 IRON DEFICIENCY ANEMIA, UNSPECIFIED IRON DEFICIENCY ANEMIA TYPE: ICD-10-CM

## 2024-08-06 RX ORDER — FERROUS SULFATE 325(65) MG
1 TABLET ORAL
Qty: 90 TABLET | Refills: 1 | Status: SHIPPED | OUTPATIENT
Start: 2024-08-06

## 2024-09-14 LAB
BASOPHILS ABSOLUTE: 0.1 K/UL (ref 0–0.3)
BASOPHILS RELATIVE PERCENT: 0.7 % (ref 0–2)
DIFFERENTIAL COUNT: ABNORMAL
EOSINOPHILS ABSOLUTE: 0.2 K/UL (ref 0–0.5)
EOSINOPHILS RELATIVE PERCENT: 1.8 % (ref 0–5)
HBA1C MFR BLD: 7 %
HCT VFR BLD CALC: 44.5 % (ref 34–49)
HEMOGLOBIN: 14.3 G/DL (ref 11.2–15.7)
IMMATURE GRANS (ABS): 0 K/UL (ref 0–0.1)
IMMATURE GRANULOCYTES %: 0.2 %
IRON % SATURATION: 14 % (ref 12–57)
IRON: 46 MCG/DL (ref 35–175)
LYMPHOCYTES ABSOLUTE: 2.5 K/UL (ref 0.9–4.1)
LYMPHOCYTES RELATIVE PERCENT: 27.9 % (ref 14–51)
MAGNESIUM: 2.1 MG/DL (ref 1.4–2.5)
MCH RBC QN AUTO: 28 PG (ref 26–34)
MCHC RBC AUTO-ENTMCNC: 32.1 G/DL (ref 30.7–35.5)
MCV RBC AUTO: 87.1 FL (ref 80–100)
MONOCYTES ABSOLUTE: 0.6 K/UL (ref 0.2–1)
MONOCYTES RELATIVE PERCENT: 7.2 % (ref 4–12)
NEUTROPHILS ABSOLUTE: 5.5 K/UL (ref 1.8–7.5)
NEUTROPHILS RELATIVE PERCENT: 62.2 % (ref 42–80)
PDW BLD-RTO: 13.2 %
PLATELET # BLD: 371 K/UL (ref 140–400)
PMV BLD AUTO: 11.8 FL (ref 7.2–11.7)
RBC # BLD: 5.11 M/UL (ref 3.95–5.26)
RETICULOCYTE ABSOLUTE COUNT: 0 /100 WBC
TOTAL IRON BINDING CAPACITY: 320 MCG/DL (ref 200–450)
WBC # BLD: 8.8 K/UL (ref 3.5–10.9)

## 2024-09-17 ENCOUNTER — OFFICE VISIT (OUTPATIENT)
Dept: INTERNAL MEDICINE CLINIC | Age: 49
End: 2024-09-17
Payer: COMMERCIAL

## 2024-09-17 VITALS
HEIGHT: 65 IN | SYSTOLIC BLOOD PRESSURE: 112 MMHG | HEART RATE: 98 BPM | BODY MASS INDEX: 40.32 KG/M2 | DIASTOLIC BLOOD PRESSURE: 80 MMHG | WEIGHT: 242 LBS | OXYGEN SATURATION: 98 %

## 2024-09-17 DIAGNOSIS — Z12.31 SCREENING MAMMOGRAM FOR BREAST CANCER: ICD-10-CM

## 2024-09-17 DIAGNOSIS — E78.5 HYPERLIPIDEMIA, UNSPECIFIED HYPERLIPIDEMIA TYPE: ICD-10-CM

## 2024-09-17 DIAGNOSIS — D32.0 INTRACRANIAL MENINGIOMA (HCC): ICD-10-CM

## 2024-09-17 DIAGNOSIS — F33.1 MAJOR DEPRESSIVE DISORDER, RECURRENT, MODERATE (HCC): ICD-10-CM

## 2024-09-17 DIAGNOSIS — I10 ESSENTIAL HYPERTENSION: ICD-10-CM

## 2024-09-17 DIAGNOSIS — E11.65 TYPE 2 DIABETES MELLITUS WITH HYPERGLYCEMIA, WITHOUT LONG-TERM CURRENT USE OF INSULIN (HCC): Primary | ICD-10-CM

## 2024-09-17 PROCEDURE — 3079F DIAST BP 80-89 MM HG: CPT | Performed by: FAMILY MEDICINE

## 2024-09-17 PROCEDURE — 3074F SYST BP LT 130 MM HG: CPT | Performed by: FAMILY MEDICINE

## 2024-09-17 PROCEDURE — 3051F HG A1C>EQUAL 7.0%<8.0%: CPT | Performed by: FAMILY MEDICINE

## 2024-09-17 PROCEDURE — 99214 OFFICE O/P EST MOD 30 MIN: CPT | Performed by: FAMILY MEDICINE

## 2024-09-17 SDOH — ECONOMIC STABILITY: INCOME INSECURITY: HOW HARD IS IT FOR YOU TO PAY FOR THE VERY BASICS LIKE FOOD, HOUSING, MEDICAL CARE, AND HEATING?: NOT HARD AT ALL

## 2024-09-17 SDOH — ECONOMIC STABILITY: FOOD INSECURITY: WITHIN THE PAST 12 MONTHS, YOU WORRIED THAT YOUR FOOD WOULD RUN OUT BEFORE YOU GOT MONEY TO BUY MORE.: NEVER TRUE

## 2024-09-17 SDOH — ECONOMIC STABILITY: FOOD INSECURITY: WITHIN THE PAST 12 MONTHS, THE FOOD YOU BOUGHT JUST DIDN'T LAST AND YOU DIDN'T HAVE MONEY TO GET MORE.: NEVER TRUE

## 2024-09-17 ASSESSMENT — ENCOUNTER SYMPTOMS
NAUSEA: 0
ABDOMINAL PAIN: 0
SHORTNESS OF BREATH: 0
BACK PAIN: 0

## 2024-09-29 DIAGNOSIS — F33.1 MAJOR DEPRESSIVE DISORDER, RECURRENT, MODERATE (HCC): ICD-10-CM

## 2024-10-20 DIAGNOSIS — I10 ESSENTIAL HYPERTENSION: ICD-10-CM

## 2024-10-20 DIAGNOSIS — D32.9 MENINGIOMA (HCC): ICD-10-CM

## 2024-10-21 RX ORDER — LEVETIRACETAM 1000 MG/1
1000 TABLET ORAL 2 TIMES DAILY
Qty: 180 TABLET | Refills: 1 | Status: SHIPPED | OUTPATIENT
Start: 2024-10-21

## 2024-10-21 RX ORDER — LABETALOL 200 MG/1
200 TABLET, FILM COATED ORAL 2 TIMES DAILY
Qty: 180 TABLET | Refills: 1 | Status: SHIPPED | OUTPATIENT
Start: 2024-10-21

## 2024-10-21 RX ORDER — AMLODIPINE BESYLATE 10 MG/1
10 TABLET ORAL DAILY
Qty: 90 TABLET | Refills: 1 | Status: SHIPPED | OUTPATIENT
Start: 2024-10-21

## 2024-10-26 DIAGNOSIS — K21.9 GASTROESOPHAGEAL REFLUX DISEASE WITHOUT ESOPHAGITIS: ICD-10-CM

## 2024-10-27 DIAGNOSIS — I10 ESSENTIAL HYPERTENSION: ICD-10-CM

## 2024-10-28 RX ORDER — PANTOPRAZOLE SODIUM 40 MG/1
40 TABLET, DELAYED RELEASE ORAL
Qty: 90 TABLET | Refills: 1 | Status: SHIPPED | OUTPATIENT
Start: 2024-10-28

## 2024-10-28 RX ORDER — LOSARTAN POTASSIUM 100 MG/1
100 TABLET ORAL DAILY
Qty: 90 TABLET | Refills: 1 | Status: SHIPPED | OUTPATIENT
Start: 2024-10-28

## 2024-12-05 NOTE — TELEPHONE ENCOUNTER
Got a call from the patients , asked if someone could give him a call back. Wants to discuss wife getting monitor and test strips to keep track of blood sugar.    Yes

## 2024-12-18 DIAGNOSIS — E83.42 HYPOMAGNESEMIA: ICD-10-CM

## 2024-12-18 DIAGNOSIS — E78.5 HYPERLIPIDEMIA, UNSPECIFIED HYPERLIPIDEMIA TYPE: ICD-10-CM

## 2024-12-18 DIAGNOSIS — D50.9 IRON DEFICIENCY ANEMIA, UNSPECIFIED IRON DEFICIENCY ANEMIA TYPE: ICD-10-CM

## 2024-12-19 RX ORDER — ATORVASTATIN CALCIUM 20 MG/1
20 TABLET, FILM COATED ORAL DAILY
Qty: 90 TABLET | Refills: 0 | Status: SHIPPED | OUTPATIENT
Start: 2024-12-19

## 2024-12-19 RX ORDER — FERROUS SULFATE 325(65) MG
1 TABLET ORAL
Qty: 90 TABLET | Refills: 0 | Status: SHIPPED | OUTPATIENT
Start: 2024-12-19

## 2024-12-19 RX ORDER — MAGNESIUM OXIDE 400 MG/1
1 TABLET ORAL DAILY
Qty: 30 TABLET | Refills: 5 | Status: SHIPPED | OUTPATIENT
Start: 2024-12-19

## 2025-01-21 DIAGNOSIS — E83.42 HYPOMAGNESEMIA: ICD-10-CM

## 2025-01-22 RX ORDER — MAGNESIUM OXIDE 400 MG/1
1 TABLET ORAL DAILY
Qty: 30 TABLET | Refills: 1 | Status: SHIPPED | OUTPATIENT
Start: 2025-01-22

## 2025-02-27 DIAGNOSIS — D50.9 IRON DEFICIENCY ANEMIA, UNSPECIFIED IRON DEFICIENCY ANEMIA TYPE: ICD-10-CM

## 2025-02-27 DIAGNOSIS — D32.9 MENINGIOMA (HCC): ICD-10-CM

## 2025-02-27 DIAGNOSIS — I10 ESSENTIAL HYPERTENSION: ICD-10-CM

## 2025-02-27 DIAGNOSIS — K21.9 GASTROESOPHAGEAL REFLUX DISEASE WITHOUT ESOPHAGITIS: ICD-10-CM

## 2025-02-27 DIAGNOSIS — E78.5 HYPERLIPIDEMIA, UNSPECIFIED HYPERLIPIDEMIA TYPE: ICD-10-CM

## 2025-02-28 RX ORDER — LABETALOL 200 MG/1
200 TABLET, FILM COATED ORAL 2 TIMES DAILY
Qty: 180 TABLET | Refills: 0 | Status: SHIPPED | OUTPATIENT
Start: 2025-02-28

## 2025-02-28 RX ORDER — PANTOPRAZOLE SODIUM 40 MG/1
40 TABLET, DELAYED RELEASE ORAL
Qty: 90 TABLET | Refills: 0 | Status: SHIPPED | OUTPATIENT
Start: 2025-02-28

## 2025-02-28 RX ORDER — AMLODIPINE BESYLATE 10 MG/1
10 TABLET ORAL DAILY
Qty: 90 TABLET | Refills: 0 | Status: SHIPPED | OUTPATIENT
Start: 2025-02-28

## 2025-02-28 RX ORDER — FERROUS SULFATE 325(65) MG
1 TABLET ORAL
Qty: 90 TABLET | Refills: 0 | Status: SHIPPED | OUTPATIENT
Start: 2025-02-28

## 2025-02-28 RX ORDER — ATORVASTATIN CALCIUM 20 MG/1
20 TABLET, FILM COATED ORAL DAILY
Qty: 90 TABLET | Refills: 0 | Status: SHIPPED | OUTPATIENT
Start: 2025-02-28

## 2025-02-28 RX ORDER — LOSARTAN POTASSIUM 100 MG/1
100 TABLET ORAL DAILY
Qty: 90 TABLET | Refills: 0 | Status: SHIPPED | OUTPATIENT
Start: 2025-02-28

## 2025-02-28 RX ORDER — LEVETIRACETAM 1000 MG/1
1000 TABLET ORAL 2 TIMES DAILY
Qty: 180 TABLET | Refills: 0 | Status: SHIPPED | OUTPATIENT
Start: 2025-02-28

## 2025-03-29 LAB
A/G RATIO: 1.3 RATIO (ref 0.8–2.6)
ALBUMIN: 3.9 G/DL (ref 3.5–5.2)
ALP BLD-CCNC: 105 U/L (ref 23–144)
ALT SERPL-CCNC: 32 U/L (ref 0–60)
AST SERPL-CCNC: 23 U/L (ref 0–55)
BILIRUB SERPL-MCNC: 0.4 MG/DL (ref 0–1.2)
BUN / CREAT RATIO: 9 (ref 7–25)
BUN BLDV-MCNC: 7 MG/DL (ref 3–29)
CALCIUM SERPL-MCNC: 9.4 MG/DL (ref 8.5–10.5)
CHLORIDE BLD-SCNC: 100 MEQ/L (ref 96–110)
CHOLESTEROL, TOTAL: 144 MG/DL
CO2: 23 MEQ/L (ref 19–32)
CREAT SERPL-MCNC: 0.8 MG/DL (ref 0.5–1.2)
ESTIMATED GLOMERULAR FILTRATION RATE CREATININE EQUATION: 90 MLS/MIN/1.73M2
FASTING STATUS: ABNORMAL
GLOBULIN: 3 G/DL (ref 1.9–3.6)
GLUCOSE BLD-MCNC: 238 MG/DL (ref 70–99)
HBA1C MFR BLD: 9.8 %
HDLC SERPL-MCNC: 34 MG/DL
LDL CHOLESTEROL: 89 MG/DL
POTASSIUM SERPL-SCNC: 4.5 MEQ/L (ref 3.4–5.3)
SODIUM BLD-SCNC: 135 MEQ/L (ref 135–148)
TOTAL PROTEIN: 6.9 G/DL (ref 6–8.3)
TRIGL SERPL-MCNC: 106 MG/DL
VLDLC SERPL CALC-MCNC: 21 MG/DL (ref 4–32)

## 2025-04-02 ENCOUNTER — OFFICE VISIT (OUTPATIENT)
Dept: INTERNAL MEDICINE CLINIC | Age: 50
End: 2025-04-02
Payer: COMMERCIAL

## 2025-04-02 VITALS
BODY MASS INDEX: 42.43 KG/M2 | HEART RATE: 79 BPM | OXYGEN SATURATION: 97 % | WEIGHT: 255 LBS | SYSTOLIC BLOOD PRESSURE: 120 MMHG | DIASTOLIC BLOOD PRESSURE: 84 MMHG

## 2025-04-02 DIAGNOSIS — I10 ESSENTIAL HYPERTENSION: ICD-10-CM

## 2025-04-02 DIAGNOSIS — D32.9 MENINGIOMA (HCC): ICD-10-CM

## 2025-04-02 DIAGNOSIS — E11.65 TYPE 2 DIABETES MELLITUS WITH HYPERGLYCEMIA, WITHOUT LONG-TERM CURRENT USE OF INSULIN (HCC): Primary | ICD-10-CM

## 2025-04-02 DIAGNOSIS — F33.1 MAJOR DEPRESSIVE DISORDER, RECURRENT, MODERATE (HCC): ICD-10-CM

## 2025-04-02 DIAGNOSIS — E78.5 HYPERLIPIDEMIA, UNSPECIFIED HYPERLIPIDEMIA TYPE: ICD-10-CM

## 2025-04-02 PROCEDURE — 3074F SYST BP LT 130 MM HG: CPT | Performed by: FAMILY MEDICINE

## 2025-04-02 PROCEDURE — 3079F DIAST BP 80-89 MM HG: CPT | Performed by: FAMILY MEDICINE

## 2025-04-02 PROCEDURE — 99214 OFFICE O/P EST MOD 30 MIN: CPT | Performed by: FAMILY MEDICINE

## 2025-04-02 PROCEDURE — 3046F HEMOGLOBIN A1C LEVEL >9.0%: CPT | Performed by: FAMILY MEDICINE

## 2025-04-02 SDOH — ECONOMIC STABILITY: FOOD INSECURITY: WITHIN THE PAST 12 MONTHS, YOU WORRIED THAT YOUR FOOD WOULD RUN OUT BEFORE YOU GOT MONEY TO BUY MORE.: NEVER TRUE

## 2025-04-02 SDOH — ECONOMIC STABILITY: FOOD INSECURITY: WITHIN THE PAST 12 MONTHS, THE FOOD YOU BOUGHT JUST DIDN'T LAST AND YOU DIDN'T HAVE MONEY TO GET MORE.: NEVER TRUE

## 2025-04-02 ASSESSMENT — PATIENT HEALTH QUESTIONNAIRE - PHQ9
10. IF YOU CHECKED OFF ANY PROBLEMS, HOW DIFFICULT HAVE THESE PROBLEMS MADE IT FOR YOU TO DO YOUR WORK, TAKE CARE OF THINGS AT HOME, OR GET ALONG WITH OTHER PEOPLE: NOT DIFFICULT AT ALL
2. FEELING DOWN, DEPRESSED OR HOPELESS: NOT AT ALL
1. LITTLE INTEREST OR PLEASURE IN DOING THINGS: NOT AT ALL
SUM OF ALL RESPONSES TO PHQ QUESTIONS 1-9: 0
4. FEELING TIRED OR HAVING LITTLE ENERGY: NOT AT ALL
8. MOVING OR SPEAKING SO SLOWLY THAT OTHER PEOPLE COULD HAVE NOTICED. OR THE OPPOSITE, BEING SO FIGETY OR RESTLESS THAT YOU HAVE BEEN MOVING AROUND A LOT MORE THAN USUAL: NOT AT ALL
5. POOR APPETITE OR OVEREATING: NOT AT ALL
6. FEELING BAD ABOUT YOURSELF - OR THAT YOU ARE A FAILURE OR HAVE LET YOURSELF OR YOUR FAMILY DOWN: NOT AT ALL
7. TROUBLE CONCENTRATING ON THINGS, SUCH AS READING THE NEWSPAPER OR WATCHING TELEVISION: NOT AT ALL
SUM OF ALL RESPONSES TO PHQ QUESTIONS 1-9: 0
9. THOUGHTS THAT YOU WOULD BE BETTER OFF DEAD, OR OF HURTING YOURSELF: NOT AT ALL

## 2025-04-02 ASSESSMENT — ENCOUNTER SYMPTOMS
NAUSEA: 0
SHORTNESS OF BREATH: 0
ABDOMINAL PAIN: 0
COUGH: 0

## 2025-04-02 NOTE — PROGRESS NOTES
Malgorzata Martin (:  1975) is a 50 y.o. female,established patient, here for evaluation of the following chief complaint(s):  Follow-up (Routine visit. No concerns), Diabetes, and Hypertension      Assessment & Plan   ASSESSMENT/PLAN:    Assessment & Plan  1. Type 2 diabetes mellitus with hyperglycemia, without long term current use of insulin (ACC).  She declines restarting metformin or any medication for diabetes. She plans to try lifestyle changes.   Hemoglobin A1c ordered.    2. Essential hypertension.  She will continue amlodipine 10 mg, labetalol 200 mg, and losartan 100 mg.    3. Pure hypercholesterolemia.  She will continue Lipitor 20 mg.    4. Major depressive disorder, recurrent, moderate (HCC).  She will continue Prozac 20 mg capsule.    5. Meningioma.  History of craniotomy with postoperative infection. She is currently on chronic Duricef, and follows up with Infectious Diseases every 6 months.    Medications reconciled and discussed with the patient  Return for obtain in 3 1/2 to 4 months for DM.       Lab Results   Component Value Date    WBC 8.8 2024    HGB 14.3 2024    HCT 44.5 2024    MCV 87.1 2024     2024     Lab Results   Component Value Date    CHOL 144 2025     Lab Results   Component Value Date    TRIG 106 2025     Lab Results   Component Value Date    HDL 34 (L) 2025     Lab Results   Component Value Date    LDL 89 2025       Lab Results   Component Value Date    LABA1C 9.8 (H) 2025     Lab Results   Component Value Date     2023     Lab Results   Component Value Date     2025    K 4.5 2025     2025    CO2 23 2025    BUN 7 2025    CREATININE 0.8 2025    GLUCOSE 238 (H) 2025    CALCIUM 9.4 2025    BILITOT 0.4 2025    ALKPHOS 105 2025    AST 23 2025    ALT 32 2025    LABGLOM 116 2024    AGRATIO 1.3 2025    GLOB

## 2025-04-09 DIAGNOSIS — E83.42 HYPOMAGNESEMIA: ICD-10-CM

## 2025-04-10 RX ORDER — MAGNESIUM OXIDE 400 MG/1
1 TABLET ORAL DAILY
Qty: 90 TABLET | Refills: 0 | Status: SHIPPED | OUTPATIENT
Start: 2025-04-10

## 2025-04-14 DIAGNOSIS — F33.1 MAJOR DEPRESSIVE DISORDER, RECURRENT, MODERATE (HCC): ICD-10-CM

## 2025-06-21 DIAGNOSIS — K21.9 GASTROESOPHAGEAL REFLUX DISEASE WITHOUT ESOPHAGITIS: ICD-10-CM

## 2025-06-21 DIAGNOSIS — I10 ESSENTIAL HYPERTENSION: ICD-10-CM

## 2025-06-21 DIAGNOSIS — E78.5 HYPERLIPIDEMIA, UNSPECIFIED HYPERLIPIDEMIA TYPE: ICD-10-CM

## 2025-06-21 DIAGNOSIS — E83.42 HYPOMAGNESEMIA: ICD-10-CM

## 2025-06-23 RX ORDER — AMLODIPINE BESYLATE 10 MG/1
10 TABLET ORAL DAILY
Qty: 90 TABLET | Refills: 0 | Status: SHIPPED | OUTPATIENT
Start: 2025-06-23

## 2025-06-23 RX ORDER — LOSARTAN POTASSIUM 100 MG/1
100 TABLET ORAL DAILY
Qty: 90 TABLET | Refills: 0 | Status: SHIPPED | OUTPATIENT
Start: 2025-06-23

## 2025-06-23 RX ORDER — PANTOPRAZOLE SODIUM 40 MG/1
40 TABLET, DELAYED RELEASE ORAL
Qty: 90 TABLET | Refills: 0 | Status: SHIPPED | OUTPATIENT
Start: 2025-06-23

## 2025-06-23 RX ORDER — MAGNESIUM OXIDE 400 MG/1
1 TABLET ORAL DAILY
Qty: 90 TABLET | Refills: 0 | Status: SHIPPED | OUTPATIENT
Start: 2025-06-23

## 2025-06-23 RX ORDER — ATORVASTATIN CALCIUM 20 MG/1
20 TABLET, FILM COATED ORAL DAILY
Qty: 90 TABLET | Refills: 0 | Status: SHIPPED | OUTPATIENT
Start: 2025-06-23

## 2025-07-18 ENCOUNTER — OFFICE VISIT (OUTPATIENT)
Dept: INTERNAL MEDICINE CLINIC | Age: 50
End: 2025-07-18
Payer: COMMERCIAL

## 2025-07-18 VITALS
SYSTOLIC BLOOD PRESSURE: 126 MMHG | HEART RATE: 85 BPM | DIASTOLIC BLOOD PRESSURE: 84 MMHG | WEIGHT: 253 LBS | BODY MASS INDEX: 42.1 KG/M2 | OXYGEN SATURATION: 97 %

## 2025-07-18 DIAGNOSIS — I10 ESSENTIAL HYPERTENSION: ICD-10-CM

## 2025-07-18 DIAGNOSIS — D32.0 INTRACRANIAL MENINGIOMA (HCC): ICD-10-CM

## 2025-07-18 DIAGNOSIS — E78.00 PURE HYPERCHOLESTEROLEMIA: ICD-10-CM

## 2025-07-18 DIAGNOSIS — E11.65 TYPE 2 DIABETES MELLITUS WITH HYPERGLYCEMIA, WITHOUT LONG-TERM CURRENT USE OF INSULIN (HCC): Primary | ICD-10-CM

## 2025-07-18 LAB — HBA1C MFR BLD: 9.8 %

## 2025-07-18 PROCEDURE — 83036 HEMOGLOBIN GLYCOSYLATED A1C: CPT | Performed by: FAMILY MEDICINE

## 2025-07-18 PROCEDURE — 3079F DIAST BP 80-89 MM HG: CPT | Performed by: FAMILY MEDICINE

## 2025-07-18 PROCEDURE — 3074F SYST BP LT 130 MM HG: CPT | Performed by: FAMILY MEDICINE

## 2025-07-18 PROCEDURE — 99214 OFFICE O/P EST MOD 30 MIN: CPT | Performed by: FAMILY MEDICINE

## 2025-07-18 PROCEDURE — 3046F HEMOGLOBIN A1C LEVEL >9.0%: CPT | Performed by: FAMILY MEDICINE

## 2025-07-18 RX ORDER — METFORMIN HYDROCHLORIDE 500 MG/1
500 TABLET, EXTENDED RELEASE ORAL 2 TIMES DAILY
Qty: 180 TABLET | Refills: 1 | Status: SHIPPED | OUTPATIENT
Start: 2025-07-18

## 2025-07-18 ASSESSMENT — ENCOUNTER SYMPTOMS
SHORTNESS OF BREATH: 0
NAUSEA: 0
ABDOMINAL PAIN: 0
COUGH: 0

## 2025-07-18 NOTE — PROGRESS NOTES
Malgorzata Martin (:  1975) is a 50 y.o. female,established patient, here for evaluation of the following chief complaint(s):  3 Month Follow-Up, Diabetes, and Hypertension      Assessment & Plan   ASSESSMENT/PLAN:    Assessment & Plan        1. Type 2 diabetes mellitus with hyperglycemia, without long-term current use of insulin (HCC)  - Hemoglobin A1C; Future  - Albumin/Creatinine Ratio, Urine; Future  Restart  - metFORMIN (GLUCOPHAGE-XR) 500 MG extended release tablet; Take 1 tablet by mouth in the morning and at bedtime  Dispense: 180 tablet; Refill: 1  ADR's explained  Diabetic diet    2. Essential hypertension  Continue Norvasc 10 mg, and losartan 100 mg    3. Pure hyperlipidemia  Continue Lipitor 20 mg    4. Intracranial meningioma (HCC)  On chronic Duricef 500 mg  On Keppra 1000 mg   Keep f/u with specialists    Medications reconciled and discussed with the patient  The patient is asked to make an attempt to improve diet and exercise patterns   Return in about 27 weeks (around 2026) for Diabetes, HTN.       Lab Results   Component Value Date    WBC 8.8 2024    HGB 14.3 2024    HCT 44.5 2024    MCV 87.1 2024     2024     Lab Results   Component Value Date    CHOL 144 2025     Lab Results   Component Value Date    TRIG 106 2025     Lab Results   Component Value Date    HDL 34 (L) 2025     Lab Results   Component Value Date    LDL 89 2025       Lab Results   Component Value Date    LABA1C 9.8 (H) 2025     Lab Results   Component Value Date     2023     Lab Results   Component Value Date     2025    K 4.5 2025     2025    CO2 23 2025    BUN 7 2025    CREATININE 0.8 2025    GLUCOSE 238 (H) 2025    CALCIUM 9.4 2025    BILITOT 0.4 2025    ALKPHOS 105 2025    AST 23 2025    ALT 32 2025    LABGLOM 116 2024    AGRATIO 1.3 2025